# Patient Record
Sex: FEMALE | Race: WHITE | NOT HISPANIC OR LATINO | URBAN - METROPOLITAN AREA
[De-identification: names, ages, dates, MRNs, and addresses within clinical notes are randomized per-mention and may not be internally consistent; named-entity substitution may affect disease eponyms.]

---

## 2017-07-04 ENCOUNTER — EMERGENCY (EMERGENCY)
Facility: HOSPITAL | Age: 52
LOS: 1 days | Discharge: HOME | End: 2017-07-04
Attending: EMERGENCY MEDICINE | Admitting: EMERGENCY MEDICINE
Payer: MEDICARE

## 2017-07-04 VITALS
SYSTOLIC BLOOD PRESSURE: 126 MMHG | RESPIRATION RATE: 14 BRPM | OXYGEN SATURATION: 96 % | HEART RATE: 80 BPM | WEIGHT: 190.04 LBS | DIASTOLIC BLOOD PRESSURE: 68 MMHG | TEMPERATURE: 99 F | HEIGHT: 64 IN

## 2017-07-04 VITALS
HEART RATE: 77 BPM | RESPIRATION RATE: 14 BRPM | TEMPERATURE: 98 F | OXYGEN SATURATION: 97 % | DIASTOLIC BLOOD PRESSURE: 64 MMHG | SYSTOLIC BLOOD PRESSURE: 124 MMHG

## 2017-07-04 DIAGNOSIS — R10.9 UNSPECIFIED ABDOMINAL PAIN: ICD-10-CM

## 2017-07-04 DIAGNOSIS — J45.909 UNSPECIFIED ASTHMA, UNCOMPLICATED: ICD-10-CM

## 2017-07-04 DIAGNOSIS — I10 ESSENTIAL (PRIMARY) HYPERTENSION: ICD-10-CM

## 2017-07-04 DIAGNOSIS — Z98.890 OTHER SPECIFIED POSTPROCEDURAL STATES: ICD-10-CM

## 2017-07-04 DIAGNOSIS — Z98.89 OTHER SPECIFIED POSTPROCEDURAL STATES: Chronic | ICD-10-CM

## 2017-07-04 DIAGNOSIS — Z90.710 ACQUIRED ABSENCE OF BOTH CERVIX AND UTERUS: ICD-10-CM

## 2017-07-04 DIAGNOSIS — E11.9 TYPE 2 DIABETES MELLITUS WITHOUT COMPLICATIONS: ICD-10-CM

## 2017-07-04 LAB
ALBUMIN SERPL ELPH-MCNC: 4 G/DL — SIGNIFICANT CHANGE UP (ref 3.3–5)
ALP SERPL-CCNC: 47 U/L — SIGNIFICANT CHANGE UP (ref 40–120)
ALT FLD-CCNC: 28 U/L — SIGNIFICANT CHANGE UP (ref 12–78)
AMYLASE P1 CFR SERPL: 48 U/L — SIGNIFICANT CHANGE UP (ref 25–115)
ANION GAP SERPL CALC-SCNC: 6 MMOL/L — SIGNIFICANT CHANGE UP (ref 5–17)
ANISOCYTOSIS BLD QL: SLIGHT — SIGNIFICANT CHANGE UP
APPEARANCE UR: CLEAR — SIGNIFICANT CHANGE UP
APTT BLD: 32.4 SEC — SIGNIFICANT CHANGE UP (ref 27.5–37.4)
AST SERPL-CCNC: 24 U/L — SIGNIFICANT CHANGE UP (ref 15–37)
BACTERIA # UR AUTO: ABNORMAL
BASO STIPL BLD QL SMEAR: PRESENT — SIGNIFICANT CHANGE UP
BASOPHILS # BLD AUTO: 0.1 K/UL — SIGNIFICANT CHANGE UP (ref 0–0.2)
BASOPHILS NFR BLD AUTO: 0.8 % — SIGNIFICANT CHANGE UP (ref 0–2)
BILIRUB SERPL-MCNC: 0.3 MG/DL — SIGNIFICANT CHANGE UP (ref 0.2–1.2)
BILIRUB UR-MCNC: NEGATIVE — SIGNIFICANT CHANGE UP
BUN SERPL-MCNC: 5 MG/DL — LOW (ref 7–23)
CALCIUM SERPL-MCNC: 8.6 MG/DL — SIGNIFICANT CHANGE UP (ref 8.5–10.1)
CHLORIDE SERPL-SCNC: 98 MMOL/L — SIGNIFICANT CHANGE UP (ref 96–108)
CO2 SERPL-SCNC: 31 MMOL/L — SIGNIFICANT CHANGE UP (ref 22–31)
COLOR SPEC: YELLOW — SIGNIFICANT CHANGE UP
CREAT SERPL-MCNC: 0.7 MG/DL — SIGNIFICANT CHANGE UP (ref 0.5–1.3)
DIFF PNL FLD: ABNORMAL
ELLIPTOCYTES BLD QL SMEAR: SLIGHT — SIGNIFICANT CHANGE UP
EOSINOPHIL # BLD AUTO: 0.1 K/UL — SIGNIFICANT CHANGE UP (ref 0–0.5)
EOSINOPHIL NFR BLD AUTO: 1.3 % — SIGNIFICANT CHANGE UP (ref 0–6)
EPI CELLS # UR: SIGNIFICANT CHANGE UP
GLUCOSE SERPL-MCNC: 50 MG/DL — LOW (ref 70–99)
GLUCOSE UR QL: NEGATIVE — SIGNIFICANT CHANGE UP
HCG SERPL-ACNC: <1 MIU/ML — SIGNIFICANT CHANGE UP
HCT VFR BLD CALC: 32.7 % — LOW (ref 34.5–45)
HGB BLD-MCNC: 10.2 G/DL — LOW (ref 11.5–15.5)
HYPOCHROMIA BLD QL: SLIGHT — SIGNIFICANT CHANGE UP
INR BLD: 1.05 RATIO — SIGNIFICANT CHANGE UP (ref 0.88–1.16)
KETONES UR-MCNC: NEGATIVE — SIGNIFICANT CHANGE UP
LACTATE SERPL-SCNC: 1 MMOL/L — SIGNIFICANT CHANGE UP (ref 0.7–2)
LEUKOCYTE ESTERASE UR-ACNC: NEGATIVE — SIGNIFICANT CHANGE UP
LG PLATELETS BLD QL AUTO: SLIGHT — SIGNIFICANT CHANGE UP
LIDOCAIN IGE QN: 118 U/L — SIGNIFICANT CHANGE UP (ref 73–393)
LYMPHOCYTES # BLD AUTO: 2.8 K/UL — SIGNIFICANT CHANGE UP (ref 1–3.3)
LYMPHOCYTES # BLD AUTO: 26.2 % — SIGNIFICANT CHANGE UP (ref 13–44)
MACROCYTES BLD QL: SLIGHT — SIGNIFICANT CHANGE UP
MCHC RBC-ENTMCNC: 23 PG — LOW (ref 27–34)
MCHC RBC-ENTMCNC: 31.2 GM/DL — LOW (ref 32–36)
MCV RBC AUTO: 73.8 FL — LOW (ref 80–100)
MICROCYTES BLD QL: SLIGHT — SIGNIFICANT CHANGE UP
MONOCYTES # BLD AUTO: 1.1 K/UL — HIGH (ref 0–0.9)
MONOCYTES NFR BLD AUTO: 10.5 % — HIGH (ref 1–9)
NEUTROPHILS # BLD AUTO: 6.5 K/UL — SIGNIFICANT CHANGE UP (ref 1.8–7.4)
NEUTROPHILS NFR BLD AUTO: 61.2 % — SIGNIFICANT CHANGE UP (ref 43–77)
NITRITE UR-MCNC: NEGATIVE — SIGNIFICANT CHANGE UP
PH UR: 8 — SIGNIFICANT CHANGE UP (ref 5–8)
PLAT MORPH BLD: NORMAL — SIGNIFICANT CHANGE UP
PLATELET # BLD AUTO: 340 K/UL — SIGNIFICANT CHANGE UP (ref 150–400)
POIKILOCYTOSIS BLD QL AUTO: SLIGHT — SIGNIFICANT CHANGE UP
POLYCHROMASIA BLD QL SMEAR: SLIGHT — SIGNIFICANT CHANGE UP
POTASSIUM SERPL-MCNC: 3.7 MMOL/L — SIGNIFICANT CHANGE UP (ref 3.5–5.3)
POTASSIUM SERPL-SCNC: 3.7 MMOL/L — SIGNIFICANT CHANGE UP (ref 3.5–5.3)
PROT SERPL-MCNC: 7.9 G/DL — SIGNIFICANT CHANGE UP (ref 6–8.3)
PROT UR-MCNC: NEGATIVE — SIGNIFICANT CHANGE UP
PROTHROM AB SERPL-ACNC: 11.5 SEC — SIGNIFICANT CHANGE UP (ref 9.8–12.7)
RBC # BLD: 4.43 M/UL — SIGNIFICANT CHANGE UP (ref 3.8–5.2)
RBC # FLD: 16.9 % — HIGH (ref 10.3–14.5)
RBC BLD AUTO: SIGNIFICANT CHANGE UP
RBC CASTS # UR COMP ASSIST: SIGNIFICANT CHANGE UP /HPF (ref 0–4)
SODIUM SERPL-SCNC: 135 MMOL/L — SIGNIFICANT CHANGE UP (ref 135–145)
SP GR SPEC: 1.01 — SIGNIFICANT CHANGE UP (ref 1.01–1.02)
STOMATOCYTES BLD QL SMEAR: PRESENT — SIGNIFICANT CHANGE UP
UROBILINOGEN FLD QL: NEGATIVE — SIGNIFICANT CHANGE UP
WBC # BLD: 10.6 K/UL — HIGH (ref 3.8–10.5)
WBC # FLD AUTO: 10.6 K/UL — HIGH (ref 3.8–10.5)
WBC UR QL: SIGNIFICANT CHANGE UP

## 2017-07-04 PROCEDURE — 87086 URINE CULTURE/COLONY COUNT: CPT

## 2017-07-04 PROCEDURE — 71046 X-RAY EXAM CHEST 2 VIEWS: CPT

## 2017-07-04 PROCEDURE — 99284 EMERGENCY DEPT VISIT MOD MDM: CPT

## 2017-07-04 PROCEDURE — 85027 COMPLETE CBC AUTOMATED: CPT

## 2017-07-04 PROCEDURE — 74176 CT ABD & PELVIS W/O CONTRAST: CPT

## 2017-07-04 PROCEDURE — 84702 CHORIONIC GONADOTROPIN TEST: CPT

## 2017-07-04 PROCEDURE — 85730 THROMBOPLASTIN TIME PARTIAL: CPT

## 2017-07-04 PROCEDURE — 96361 HYDRATE IV INFUSION ADD-ON: CPT

## 2017-07-04 PROCEDURE — 85610 PROTHROMBIN TIME: CPT

## 2017-07-04 PROCEDURE — 83605 ASSAY OF LACTIC ACID: CPT

## 2017-07-04 PROCEDURE — 99284 EMERGENCY DEPT VISIT MOD MDM: CPT | Mod: 25

## 2017-07-04 PROCEDURE — 82150 ASSAY OF AMYLASE: CPT

## 2017-07-04 PROCEDURE — 83690 ASSAY OF LIPASE: CPT

## 2017-07-04 PROCEDURE — 71020: CPT | Mod: 26

## 2017-07-04 PROCEDURE — 80053 COMPREHEN METABOLIC PANEL: CPT

## 2017-07-04 PROCEDURE — 74176 CT ABD & PELVIS W/O CONTRAST: CPT | Mod: 26

## 2017-07-04 PROCEDURE — 81001 URINALYSIS AUTO W/SCOPE: CPT

## 2017-07-04 PROCEDURE — 96374 THER/PROPH/DIAG INJ IV PUSH: CPT

## 2017-07-04 RX ORDER — SODIUM CHLORIDE 9 MG/ML
1000 INJECTION INTRAMUSCULAR; INTRAVENOUS; SUBCUTANEOUS ONCE
Qty: 0 | Refills: 0 | Status: COMPLETED | OUTPATIENT
Start: 2017-07-04 | End: 2017-07-04

## 2017-07-04 RX ORDER — CEFUROXIME AXETIL 250 MG
500 TABLET ORAL ONCE
Qty: 0 | Refills: 0 | Status: COMPLETED | OUTPATIENT
Start: 2017-07-04 | End: 2017-07-04

## 2017-07-04 RX ORDER — KETOROLAC TROMETHAMINE 30 MG/ML
30 SYRINGE (ML) INJECTION ONCE
Qty: 0 | Refills: 0 | Status: DISCONTINUED | OUTPATIENT
Start: 2017-07-04 | End: 2017-07-04

## 2017-07-04 RX ORDER — CEFOXITIN 1 G/1
1 INJECTION, POWDER, FOR SOLUTION INTRAVENOUS
Qty: 14 | Refills: 0 | OUTPATIENT
Start: 2017-07-04 | End: 2017-07-11

## 2017-07-04 RX ORDER — SODIUM CHLORIDE 9 MG/ML
3 INJECTION INTRAMUSCULAR; INTRAVENOUS; SUBCUTANEOUS ONCE
Qty: 0 | Refills: 0 | Status: COMPLETED | OUTPATIENT
Start: 2017-07-04 | End: 2017-07-04

## 2017-07-04 RX ADMIN — Medication 30 MILLIGRAM(S): at 16:16

## 2017-07-04 RX ADMIN — SODIUM CHLORIDE 1000 MILLILITER(S): 9 INJECTION INTRAMUSCULAR; INTRAVENOUS; SUBCUTANEOUS at 16:16

## 2017-07-04 RX ADMIN — SODIUM CHLORIDE 3 MILLILITER(S): 9 INJECTION INTRAMUSCULAR; INTRAVENOUS; SUBCUTANEOUS at 15:05

## 2017-07-04 RX ADMIN — Medication 500 MILLIGRAM(S): at 16:28

## 2017-07-04 RX ADMIN — Medication 30 MILLIGRAM(S): at 16:42

## 2017-07-04 RX ADMIN — SODIUM CHLORIDE 2000 MILLILITER(S): 9 INJECTION INTRAMUSCULAR; INTRAVENOUS; SUBCUTANEOUS at 15:12

## 2017-07-04 NOTE — ED PROVIDER NOTE - ENMT, MLM
Airway patent, Nasal mucosa clear. Mouth with normal mucosa. Throat has no vesicles, no oropharyngeal exudates and uvula is midline. MM Moist. Non-toxic, well appearing. Neck supple. no meningeal signs.

## 2017-07-04 NOTE — ED PROVIDER NOTE - PROGRESS NOTE DETAILS
Reevaluated patient at bedside.  Patient feeling much improved.  Abdomen is now soft, non-tender. Discussed the results of all diagnostic testing in ED.  An opportunity to ask questions was given.  Discussed the nature of diagnostic testing in the abdomen and the importance of continued reevaluation.  Understanding of the potential risk of occult pathology was verbalized and the patient will continue to follow-up as an outpatient for further workup and evaluation until resolution.  Discussed the importance of prompt, close medical follow-up.  Patient will return with any changes, concerns or persistent / worsening symptoms.  Dw pt re risk of occult UTI, will rx with abx. Pt will call pmd in am for prompt fu (Renu)  All results were explained to patient and a copy of all available results given.

## 2017-07-04 NOTE — ED PROVIDER NOTE - OBJECTIVE STATEMENT
52 yo F p/w R back pain rad to flank / abd today. no fever/chills> No dysuria / hematuria. NO neck pain. No fever/chills. Pt states feels slight bloated. No dysuria / hematuria. NO neck / back pain. no agg/allev factors. No recent fall / trauma. No numb/ting/focal weak. No cp/sob/palp. No other inj or co. 52 yo F p/w R back pain rad to flank / abd today. no fever/chills> No dysuria / hematuria. NO neck pain. No fever/chills. Pt states feels slight bloated. No dysuria / hematuria. NO neck / back pain. no agg/allev factors. No recent fall / trauma. No numb/ting/focal weak. No cp/sob/palp. No other inj or co.  pt states had same sx last year and was assoc with UTI.

## 2017-07-04 NOTE — ED ADULT NURSE NOTE - PMH
Asthma in adult  mild intermittent asthma  Bipolar disorder    Deviated septum  snores  Diabetes mellitus  type 2  H/O heartburn    H/O: HTN (hypertension)    History of spinal stenosis    Obstructive sleep apnea    Sciatica of left side  with burning sensation left foot

## 2017-07-04 NOTE — ED ADULT NURSE NOTE - OBJECTIVE STATEMENT
Pt presents to the subacute area s/p right flank pain radiating to the groin area, denies difficulty urinating.

## 2017-07-05 LAB
CULTURE RESULTS: NO GROWTH — SIGNIFICANT CHANGE UP
SPECIMEN SOURCE: SIGNIFICANT CHANGE UP

## 2018-02-07 ENCOUNTER — EMERGENCY (EMERGENCY)
Facility: HOSPITAL | Age: 53
LOS: 1 days | Discharge: HOME | End: 2018-02-07
Attending: EMERGENCY MEDICINE | Admitting: EMERGENCY MEDICINE
Payer: MEDICARE

## 2018-02-07 VITALS
WEIGHT: 192.9 LBS | SYSTOLIC BLOOD PRESSURE: 130 MMHG | DIASTOLIC BLOOD PRESSURE: 82 MMHG | HEART RATE: 90 BPM | RESPIRATION RATE: 18 BRPM | TEMPERATURE: 99 F | OXYGEN SATURATION: 98 % | HEIGHT: 64 IN

## 2018-02-07 DIAGNOSIS — Z98.89 OTHER SPECIFIED POSTPROCEDURAL STATES: Chronic | ICD-10-CM

## 2018-02-07 LAB
ALBUMIN SERPL ELPH-MCNC: 3.8 G/DL — SIGNIFICANT CHANGE UP (ref 3.3–5)
ALP SERPL-CCNC: 50 U/L — SIGNIFICANT CHANGE UP (ref 40–120)
ALT FLD-CCNC: 30 U/L — SIGNIFICANT CHANGE UP (ref 12–78)
ANION GAP SERPL CALC-SCNC: 10 MMOL/L — SIGNIFICANT CHANGE UP (ref 5–17)
AST SERPL-CCNC: 34 U/L — SIGNIFICANT CHANGE UP (ref 15–37)
BASOPHILS # BLD AUTO: 0.1 K/UL — SIGNIFICANT CHANGE UP (ref 0–0.2)
BASOPHILS NFR BLD AUTO: 1.3 % — SIGNIFICANT CHANGE UP (ref 0–2)
BILIRUB SERPL-MCNC: 0.4 MG/DL — SIGNIFICANT CHANGE UP (ref 0.2–1.2)
BUN SERPL-MCNC: 9 MG/DL — SIGNIFICANT CHANGE UP (ref 7–23)
CALCIUM SERPL-MCNC: 8.7 MG/DL — SIGNIFICANT CHANGE UP (ref 8.5–10.1)
CHLORIDE SERPL-SCNC: 103 MMOL/L — SIGNIFICANT CHANGE UP (ref 96–108)
CO2 SERPL-SCNC: 25 MMOL/L — SIGNIFICANT CHANGE UP (ref 22–31)
CREAT SERPL-MCNC: 0.64 MG/DL — SIGNIFICANT CHANGE UP (ref 0.5–1.3)
EOSINOPHIL # BLD AUTO: 0 K/UL — SIGNIFICANT CHANGE UP (ref 0–0.5)
EOSINOPHIL NFR BLD AUTO: 0.5 % — SIGNIFICANT CHANGE UP (ref 0–6)
GLUCOSE SERPL-MCNC: 146 MG/DL — HIGH (ref 70–99)
HCT VFR BLD CALC: 32.7 % — LOW (ref 34.5–45)
HGB BLD-MCNC: 9.6 G/DL — LOW (ref 11.5–15.5)
LACTATE SERPL-SCNC: 1.3 MMOL/L — SIGNIFICANT CHANGE UP (ref 0.7–2)
LYMPHOCYTES # BLD AUTO: 0.9 K/UL — LOW (ref 1–3.3)
LYMPHOCYTES # BLD AUTO: 15.2 % — SIGNIFICANT CHANGE UP (ref 13–44)
MCHC RBC-ENTMCNC: 21.2 PG — LOW (ref 27–34)
MCHC RBC-ENTMCNC: 29.5 GM/DL — LOW (ref 32–36)
MCV RBC AUTO: 71.9 FL — LOW (ref 80–100)
MONOCYTES # BLD AUTO: 0.4 K/UL — SIGNIFICANT CHANGE UP (ref 0–0.9)
MONOCYTES NFR BLD AUTO: 7.2 % — SIGNIFICANT CHANGE UP (ref 1–9)
NEUTROPHILS # BLD AUTO: 4.5 K/UL — SIGNIFICANT CHANGE UP (ref 1.8–7.4)
NEUTROPHILS NFR BLD AUTO: 75.8 % — SIGNIFICANT CHANGE UP (ref 43–77)
PLAT MORPH BLD: NORMAL — SIGNIFICANT CHANGE UP
PLATELET # BLD AUTO: 276 K/UL — SIGNIFICANT CHANGE UP (ref 150–400)
POTASSIUM SERPL-MCNC: 3.5 MMOL/L — SIGNIFICANT CHANGE UP (ref 3.5–5.3)
POTASSIUM SERPL-SCNC: 3.5 MMOL/L — SIGNIFICANT CHANGE UP (ref 3.5–5.3)
PROT SERPL-MCNC: 7.9 G/DL — SIGNIFICANT CHANGE UP (ref 6–8.3)
RBC # BLD: 4.55 M/UL — SIGNIFICANT CHANGE UP (ref 3.8–5.2)
RBC # FLD: 18 % — HIGH (ref 10.3–14.5)
RBC BLD AUTO: SIGNIFICANT CHANGE UP
SODIUM SERPL-SCNC: 138 MMOL/L — SIGNIFICANT CHANGE UP (ref 135–145)
WBC # BLD: 6 K/UL — SIGNIFICANT CHANGE UP (ref 3.8–10.5)
WBC # FLD AUTO: 6 K/UL — SIGNIFICANT CHANGE UP (ref 3.8–10.5)

## 2018-02-07 PROCEDURE — 99285 EMERGENCY DEPT VISIT HI MDM: CPT

## 2018-02-07 PROCEDURE — 71046 X-RAY EXAM CHEST 2 VIEWS: CPT | Mod: 26

## 2018-02-07 RX ORDER — ALBUTEROL 90 UG/1
2.5 AEROSOL, METERED ORAL ONCE
Qty: 0 | Refills: 0 | Status: COMPLETED | OUTPATIENT
Start: 2018-02-07 | End: 2018-02-07

## 2018-02-07 RX ORDER — FAMOTIDINE 10 MG/ML
20 INJECTION INTRAVENOUS ONCE
Qty: 0 | Refills: 0 | Status: COMPLETED | OUTPATIENT
Start: 2018-02-07 | End: 2018-02-07

## 2018-02-07 RX ORDER — ONDANSETRON 8 MG/1
4 TABLET, FILM COATED ORAL ONCE
Qty: 0 | Refills: 0 | Status: COMPLETED | OUTPATIENT
Start: 2018-02-07 | End: 2018-02-07

## 2018-02-07 RX ORDER — SODIUM CHLORIDE 9 MG/ML
3 INJECTION INTRAMUSCULAR; INTRAVENOUS; SUBCUTANEOUS ONCE
Qty: 0 | Refills: 0 | Status: COMPLETED | OUTPATIENT
Start: 2018-02-07 | End: 2018-02-07

## 2018-02-07 RX ORDER — IPRATROPIUM/ALBUTEROL SULFATE 18-103MCG
3 AEROSOL WITH ADAPTER (GRAM) INHALATION ONCE
Qty: 0 | Refills: 0 | Status: COMPLETED | OUTPATIENT
Start: 2018-02-07 | End: 2018-02-07

## 2018-02-07 RX ORDER — BUDESONIDE, MICRONIZED 100 %
0.5 POWDER (GRAM) MISCELLANEOUS ONCE
Qty: 0 | Refills: 0 | Status: COMPLETED | OUTPATIENT
Start: 2018-02-07 | End: 2018-02-07

## 2018-02-07 RX ORDER — SODIUM CHLORIDE 9 MG/ML
1000 INJECTION INTRAMUSCULAR; INTRAVENOUS; SUBCUTANEOUS
Qty: 0 | Refills: 0 | Status: COMPLETED | OUTPATIENT
Start: 2018-02-07 | End: 2018-02-08

## 2018-02-07 RX ADMIN — SODIUM CHLORIDE 1000 MILLILITER(S): 9 INJECTION INTRAMUSCULAR; INTRAVENOUS; SUBCUTANEOUS at 23:10

## 2018-02-07 RX ADMIN — SODIUM CHLORIDE 3 MILLILITER(S): 9 INJECTION INTRAMUSCULAR; INTRAVENOUS; SUBCUTANEOUS at 23:00

## 2018-02-07 RX ADMIN — FAMOTIDINE 20 MILLIGRAM(S): 10 INJECTION INTRAVENOUS at 23:12

## 2018-02-07 RX ADMIN — Medication 1 MILLIGRAM(S): at 23:12

## 2018-02-07 RX ADMIN — ALBUTEROL 2.5 MILLIGRAM(S): 90 AEROSOL, METERED ORAL at 23:18

## 2018-02-07 RX ADMIN — ONDANSETRON 4 MILLIGRAM(S): 8 TABLET, FILM COATED ORAL at 23:12

## 2018-02-07 NOTE — ED PROVIDER NOTE - OBJECTIVE STATEMENT
fever and, sweating, x 2-3 days, diarrhea x 3 today.  ho chronic back pain and on Lyrica.  pmd- Dru Sears fever and, sweating, x 2-3 days, diarrhea x 3 today.  ho chronic back pain and on Lyrica.  pmd- Ana Sears

## 2018-02-07 NOTE — ED ADULT NURSE NOTE - OBJECTIVE STATEMENT
Pt to ED via ambulance c/o cough, weakness, shortness of breath.  Pt A&Ox4, but easily agitated and angers quickly, sometimes slow to answer questions or yells "I don't know."  Pt has some inappropriate behavior.  Pt and friend at bedside state she has been sick "for months" and friend confirms that pt has been "back and forth to the doctors" and "on several different medications" but "she isn't getting better."  Pt has been coughing, c/o fatigue and weakness.

## 2018-02-08 VITALS
DIASTOLIC BLOOD PRESSURE: 69 MMHG | OXYGEN SATURATION: 93 % | RESPIRATION RATE: 17 BRPM | TEMPERATURE: 99 F | SYSTOLIC BLOOD PRESSURE: 143 MMHG | HEART RATE: 99 BPM

## 2018-02-08 LAB
FLUBV RNA SPEC QL NAA+PROBE: DETECTED
RAPID RVP RESULT: DETECTED

## 2018-02-08 PROCEDURE — 96375 TX/PRO/DX INJ NEW DRUG ADDON: CPT

## 2018-02-08 PROCEDURE — 99284 EMERGENCY DEPT VISIT MOD MDM: CPT | Mod: 25

## 2018-02-08 PROCEDURE — 36415 COLL VENOUS BLD VENIPUNCTURE: CPT

## 2018-02-08 PROCEDURE — 87633 RESP VIRUS 12-25 TARGETS: CPT

## 2018-02-08 PROCEDURE — 83605 ASSAY OF LACTIC ACID: CPT

## 2018-02-08 PROCEDURE — 87798 DETECT AGENT NOS DNA AMP: CPT

## 2018-02-08 PROCEDURE — 87040 BLOOD CULTURE FOR BACTERIA: CPT

## 2018-02-08 PROCEDURE — 71046 X-RAY EXAM CHEST 2 VIEWS: CPT

## 2018-02-08 PROCEDURE — 85027 COMPLETE CBC AUTOMATED: CPT

## 2018-02-08 PROCEDURE — 94640 AIRWAY INHALATION TREATMENT: CPT

## 2018-02-08 PROCEDURE — 80053 COMPREHEN METABOLIC PANEL: CPT

## 2018-02-08 PROCEDURE — 87486 CHLMYD PNEUM DNA AMP PROBE: CPT

## 2018-02-08 PROCEDURE — 87581 M.PNEUMON DNA AMP PROBE: CPT

## 2018-02-08 PROCEDURE — 96374 THER/PROPH/DIAG INJ IV PUSH: CPT

## 2018-02-08 PROCEDURE — 82962 GLUCOSE BLOOD TEST: CPT

## 2018-02-08 RX ADMIN — Medication 0.5 MILLIGRAM(S): at 00:03

## 2018-02-08 RX ADMIN — ALBUTEROL 2.5 MILLIGRAM(S): 90 AEROSOL, METERED ORAL at 01:11

## 2018-02-08 RX ADMIN — Medication 3 MILLILITER(S): at 01:11

## 2018-02-08 RX ADMIN — SODIUM CHLORIDE 1000 MILLILITER(S): 9 INJECTION INTRAMUSCULAR; INTRAVENOUS; SUBCUTANEOUS at 00:08

## 2018-02-13 LAB
CULTURE RESULTS: SIGNIFICANT CHANGE UP
CULTURE RESULTS: SIGNIFICANT CHANGE UP
SPECIMEN SOURCE: SIGNIFICANT CHANGE UP
SPECIMEN SOURCE: SIGNIFICANT CHANGE UP

## 2018-03-15 ENCOUNTER — INPATIENT (INPATIENT)
Facility: HOSPITAL | Age: 53
LOS: 0 days | Discharge: HOME | DRG: 812 | End: 2018-03-16
Attending: HOSPITALIST | Admitting: HOSPITALIST
Payer: MEDICARE

## 2018-03-15 VITALS
TEMPERATURE: 100 F | RESPIRATION RATE: 18 BRPM | WEIGHT: 181 LBS | DIASTOLIC BLOOD PRESSURE: 72 MMHG | OXYGEN SATURATION: 96 % | HEART RATE: 90 BPM | SYSTOLIC BLOOD PRESSURE: 112 MMHG | HEIGHT: 64 IN

## 2018-03-15 DIAGNOSIS — Z29.9 ENCOUNTER FOR PROPHYLACTIC MEASURES, UNSPECIFIED: ICD-10-CM

## 2018-03-15 DIAGNOSIS — Z98.89 OTHER SPECIFIED POSTPROCEDURAL STATES: Chronic | ICD-10-CM

## 2018-03-15 DIAGNOSIS — E11.9 TYPE 2 DIABETES MELLITUS WITHOUT COMPLICATIONS: ICD-10-CM

## 2018-03-15 DIAGNOSIS — J45.909 UNSPECIFIED ASTHMA, UNCOMPLICATED: ICD-10-CM

## 2018-03-15 DIAGNOSIS — Z86.79 PERSONAL HISTORY OF OTHER DISEASES OF THE CIRCULATORY SYSTEM: ICD-10-CM

## 2018-03-15 DIAGNOSIS — E66.9 OBESITY, UNSPECIFIED: ICD-10-CM

## 2018-03-15 DIAGNOSIS — D64.9 ANEMIA, UNSPECIFIED: ICD-10-CM

## 2018-03-15 DIAGNOSIS — G47.33 OBSTRUCTIVE SLEEP APNEA (ADULT) (PEDIATRIC): ICD-10-CM

## 2018-03-15 DIAGNOSIS — F31.9 BIPOLAR DISORDER, UNSPECIFIED: ICD-10-CM

## 2018-03-15 LAB
ALBUMIN SERPL ELPH-MCNC: 3.7 G/DL — SIGNIFICANT CHANGE UP (ref 3.3–5)
ALP SERPL-CCNC: 48 U/L — SIGNIFICANT CHANGE UP (ref 40–120)
ALT FLD-CCNC: 24 U/L — SIGNIFICANT CHANGE UP (ref 12–78)
ANION GAP SERPL CALC-SCNC: 7 MMOL/L — SIGNIFICANT CHANGE UP (ref 5–17)
APTT BLD: 30.4 SEC — SIGNIFICANT CHANGE UP (ref 27.5–37.4)
AST SERPL-CCNC: 27 U/L — SIGNIFICANT CHANGE UP (ref 15–37)
BASOPHILS # BLD AUTO: 0.2 K/UL — SIGNIFICANT CHANGE UP (ref 0–0.2)
BASOPHILS NFR BLD AUTO: 1.3 % — SIGNIFICANT CHANGE UP (ref 0–2)
BILIRUB SERPL-MCNC: 0.3 MG/DL — SIGNIFICANT CHANGE UP (ref 0.2–1.2)
BLD GP AB SCN SERPL QL: SIGNIFICANT CHANGE UP
BUN SERPL-MCNC: 14 MG/DL — SIGNIFICANT CHANGE UP (ref 7–23)
CALCIUM SERPL-MCNC: 9 MG/DL — SIGNIFICANT CHANGE UP (ref 8.5–10.1)
CHLORIDE SERPL-SCNC: 100 MMOL/L — SIGNIFICANT CHANGE UP (ref 96–108)
CO2 SERPL-SCNC: 28 MMOL/L — SIGNIFICANT CHANGE UP (ref 22–31)
CREAT SERPL-MCNC: 0.8 MG/DL — SIGNIFICANT CHANGE UP (ref 0.5–1.3)
EOSINOPHIL # BLD AUTO: 0.1 K/UL — SIGNIFICANT CHANGE UP (ref 0–0.5)
EOSINOPHIL NFR BLD AUTO: 0.9 % — SIGNIFICANT CHANGE UP (ref 0–6)
GLUCOSE SERPL-MCNC: 80 MG/DL — SIGNIFICANT CHANGE UP (ref 70–99)
HCT VFR BLD CALC: 30.1 % — LOW (ref 34.5–45)
HGB BLD-MCNC: 9.2 G/DL — LOW (ref 11.5–15.5)
INR BLD: 1.07 RATIO — SIGNIFICANT CHANGE UP (ref 0.88–1.16)
LYMPHOCYTES # BLD AUTO: 31.8 % — SIGNIFICANT CHANGE UP (ref 13–44)
LYMPHOCYTES # BLD AUTO: 4 K/UL — HIGH (ref 1–3.3)
MCHC RBC-ENTMCNC: 22 PG — LOW (ref 27–34)
MCHC RBC-ENTMCNC: 30.7 GM/DL — LOW (ref 32–36)
MCV RBC AUTO: 71.6 FL — LOW (ref 80–100)
MONOCYTES # BLD AUTO: 0.8 K/UL — SIGNIFICANT CHANGE UP (ref 0–0.9)
MONOCYTES NFR BLD AUTO: 6.3 % — SIGNIFICANT CHANGE UP (ref 1–9)
NEUTROPHILS # BLD AUTO: 7.5 K/UL — HIGH (ref 1.8–7.4)
NEUTROPHILS NFR BLD AUTO: 59.7 % — SIGNIFICANT CHANGE UP (ref 43–77)
OB PNL STL: NEGATIVE — SIGNIFICANT CHANGE UP
PCP SPEC-MCNC: SIGNIFICANT CHANGE UP
PLATELET # BLD AUTO: 479 K/UL — HIGH (ref 150–400)
POTASSIUM SERPL-MCNC: 4.3 MMOL/L — SIGNIFICANT CHANGE UP (ref 3.5–5.3)
POTASSIUM SERPL-SCNC: 4.3 MMOL/L — SIGNIFICANT CHANGE UP (ref 3.5–5.3)
PROT SERPL-MCNC: 7.7 G/DL — SIGNIFICANT CHANGE UP (ref 6–8.3)
PROTHROM AB SERPL-ACNC: 11.7 SEC — SIGNIFICANT CHANGE UP (ref 9.8–12.7)
RBC # BLD: 4.2 M/UL — SIGNIFICANT CHANGE UP (ref 3.8–5.2)
RBC # FLD: 17.1 % — HIGH (ref 10.3–14.5)
SODIUM SERPL-SCNC: 135 MMOL/L — SIGNIFICANT CHANGE UP (ref 135–145)
WBC # BLD: 12.6 K/UL — HIGH (ref 3.8–10.5)
WBC # FLD AUTO: 12.6 K/UL — HIGH (ref 3.8–10.5)

## 2018-03-15 PROCEDURE — 99223 1ST HOSP IP/OBS HIGH 75: CPT | Mod: AI,GC

## 2018-03-15 PROCEDURE — 99285 EMERGENCY DEPT VISIT HI MDM: CPT

## 2018-03-15 PROCEDURE — 93970 EXTREMITY STUDY: CPT | Mod: 26

## 2018-03-15 PROCEDURE — 71045 X-RAY EXAM CHEST 1 VIEW: CPT | Mod: 26

## 2018-03-15 PROCEDURE — 93010 ELECTROCARDIOGRAM REPORT: CPT

## 2018-03-15 RX ORDER — INSULIN LISPRO 100/ML
VIAL (ML) SUBCUTANEOUS
Qty: 0 | Refills: 0 | Status: DISCONTINUED | OUTPATIENT
Start: 2018-03-15 | End: 2018-03-16

## 2018-03-15 RX ORDER — PANTOPRAZOLE SODIUM 20 MG/1
40 TABLET, DELAYED RELEASE ORAL
Qty: 0 | Refills: 0 | Status: DISCONTINUED | OUTPATIENT
Start: 2018-03-15 | End: 2018-03-16

## 2018-03-15 RX ORDER — DEXTROSE 50 % IN WATER 50 %
12.5 SYRINGE (ML) INTRAVENOUS ONCE
Qty: 0 | Refills: 0 | Status: DISCONTINUED | OUTPATIENT
Start: 2018-03-15 | End: 2018-03-16

## 2018-03-15 RX ORDER — LEVOTHYROXINE SODIUM 125 MCG
1 TABLET ORAL
Qty: 0 | Refills: 0 | COMMUNITY

## 2018-03-15 RX ORDER — CITALOPRAM 10 MG/1
1 TABLET, FILM COATED ORAL
Qty: 0 | Refills: 0 | COMMUNITY

## 2018-03-15 RX ORDER — BUDESONIDE AND FORMOTEROL FUMARATE DIHYDRATE 160; 4.5 UG/1; UG/1
2 AEROSOL RESPIRATORY (INHALATION)
Qty: 0 | Refills: 0 | Status: DISCONTINUED | OUTPATIENT
Start: 2018-03-15 | End: 2018-03-16

## 2018-03-15 RX ORDER — DEXTROSE 50 % IN WATER 50 %
25 SYRINGE (ML) INTRAVENOUS ONCE
Qty: 0 | Refills: 0 | Status: DISCONTINUED | OUTPATIENT
Start: 2018-03-15 | End: 2018-03-16

## 2018-03-15 RX ORDER — OLANZAPINE 15 MG/1
5 TABLET, FILM COATED ORAL AT BEDTIME
Qty: 0 | Refills: 0 | Status: DISCONTINUED | OUTPATIENT
Start: 2018-03-16 | End: 2018-03-16

## 2018-03-15 RX ORDER — GLUCAGON INJECTION, SOLUTION 0.5 MG/.1ML
1 INJECTION, SOLUTION SUBCUTANEOUS ONCE
Qty: 0 | Refills: 0 | Status: DISCONTINUED | OUTPATIENT
Start: 2018-03-15 | End: 2018-03-16

## 2018-03-15 RX ORDER — LEVOTHYROXINE SODIUM 125 MCG
125 TABLET ORAL
Qty: 0 | Refills: 0 | Status: DISCONTINUED | OUTPATIENT
Start: 2018-03-17 | End: 2018-03-16

## 2018-03-15 RX ORDER — CITALOPRAM 10 MG/1
60 TABLET, FILM COATED ORAL AT BEDTIME
Qty: 0 | Refills: 0 | Status: DISCONTINUED | OUTPATIENT
Start: 2018-03-16 | End: 2018-03-16

## 2018-03-15 RX ORDER — HYDROCHLOROTHIAZIDE 25 MG
25 TABLET ORAL DAILY
Qty: 0 | Refills: 0 | Status: DISCONTINUED | OUTPATIENT
Start: 2018-03-15 | End: 2018-03-16

## 2018-03-15 RX ORDER — IPRATROPIUM/ALBUTEROL SULFATE 18-103MCG
3 AEROSOL WITH ADAPTER (GRAM) INHALATION ONCE
Qty: 0 | Refills: 0 | Status: COMPLETED | OUTPATIENT
Start: 2018-03-15 | End: 2018-03-15

## 2018-03-15 RX ORDER — SODIUM CHLORIDE 9 MG/ML
1000 INJECTION INTRAMUSCULAR; INTRAVENOUS; SUBCUTANEOUS ONCE
Qty: 0 | Refills: 0 | Status: COMPLETED | OUTPATIENT
Start: 2018-03-15 | End: 2018-03-15

## 2018-03-15 RX ORDER — ALBUTEROL 90 UG/1
2 AEROSOL, METERED ORAL EVERY 6 HOURS
Qty: 0 | Refills: 0 | Status: DISCONTINUED | OUTPATIENT
Start: 2018-03-15 | End: 2018-03-16

## 2018-03-15 RX ORDER — SODIUM CHLORIDE 9 MG/ML
1000 INJECTION, SOLUTION INTRAVENOUS
Qty: 0 | Refills: 0 | Status: DISCONTINUED | OUTPATIENT
Start: 2018-03-15 | End: 2018-03-16

## 2018-03-15 RX ORDER — LEVOTHYROXINE SODIUM 125 MCG
112 TABLET ORAL
Qty: 0 | Refills: 0 | Status: DISCONTINUED | OUTPATIENT
Start: 2018-03-16 | End: 2018-03-16

## 2018-03-15 RX ORDER — LISINOPRIL 2.5 MG/1
20 TABLET ORAL DAILY
Qty: 0 | Refills: 0 | Status: DISCONTINUED | OUTPATIENT
Start: 2018-03-15 | End: 2018-03-16

## 2018-03-15 RX ORDER — DEXTROSE 50 % IN WATER 50 %
1 SYRINGE (ML) INTRAVENOUS ONCE
Qty: 0 | Refills: 0 | Status: DISCONTINUED | OUTPATIENT
Start: 2018-03-15 | End: 2018-03-16

## 2018-03-15 RX ORDER — ATORVASTATIN CALCIUM 80 MG/1
10 TABLET, FILM COATED ORAL AT BEDTIME
Qty: 0 | Refills: 0 | Status: DISCONTINUED | OUTPATIENT
Start: 2018-03-15 | End: 2018-03-16

## 2018-03-15 RX ADMIN — Medication 3 MILLILITER(S): at 17:43

## 2018-03-15 RX ADMIN — Medication 125 MILLIGRAM(S): at 17:43

## 2018-03-15 RX ADMIN — SODIUM CHLORIDE 1000 MILLILITER(S): 9 INJECTION INTRAMUSCULAR; INTRAVENOUS; SUBCUTANEOUS at 17:43

## 2018-03-15 NOTE — CONSULT NOTE ADULT - PROBLEM SELECTOR RECOMMENDATION 3
obese  metabolic syndrome  anemia and HTN and DM  medical supportive care and regimen  HEME eval in am  serial Monitoring of Hgb and VS and HD and Sat  will follow  discussed with pt and ER MD

## 2018-03-15 NOTE — H&P ADULT - NSHPOUTPATIENTPROVIDERS_GEN_ALL_CORE
PMD: Dr. Ramirez PMD: Dr. Ramirez  Pulm: Dr. Lang  Neuro: Dr. John (pt is unsure of name and spelling)  Pain doctor: Dr. Rosenberg (pt is unsure of name and spelling)

## 2018-03-15 NOTE — H&P ADULT - PROBLEM SELECTOR PLAN 1
Transfuse  Blood consent obtained  Type and screen performed Will evaluate for GI bleed. May be more chronic in nature.  FOBT negative. No transfusion needed at this time due to patient's Hg being stable  Transfuse as needed  Blood consent obtained  Type and screen performed  Heme Dr. Prasad's group consulted. F/u recs  F/u repeat h/h microcytic  Remote Hx microcytic anemia as child?  FOBT neg, Will reevaluate for GI bleed. May be more chronic in nature.  No transfusion needed at this time due to patient's Hg being stable  Transfuse as needed  Blood consent obtained  Type and screen performed  Heme Dr. Prasad's group consulted in ED . F/u recs  F/u repeat h/h

## 2018-03-15 NOTE — CONSULT NOTE ADULT - SUBJECTIVE AND OBJECTIVE BOX
Date/Time Patient Seen:  		  Referring MD:   Data Reviewed	       Patient is a 52y old  Female who presents with a chief complaint of sob, weakness, keating, dizziness      Subjective/HPI    in bed  seen and examined  vs and meds reviewed  smoker, smokes Marijuana  lives in the house    heavy ex smoker  alert  originally from [place]    50-year-old woman with a history of diabetes, bipolar disorder, obstructive sleep apnea, hypertension,     PAST MEDICAL & SURGICAL HISTORY:  Obstructive sleep apnea  Deviated septum: snores  Sciatica of left side: with burning sensation left foot  Asthma in adult: mild intermittent asthma  H/O heartburn  Diabetes mellitus: type 2  Bipolar disorder  H/O: HTN (hypertension)  History of spinal stenosis  H/O colonoscopy  H/O unilateral oophorectomy: left ovary 2004  S/P partial hysterectomy: age 35        Medication list         MEDICATIONS  (STANDING):    MEDICATIONS  (PRN):  ALBUTerol    90 MICROgram(s) HFA Inhaler 2 Puff(s) Inhalation every 6 hours PRN Shortness of Breath and/or Wheezing         Vitals log        ICU Vital Signs Last 24 Hrs  T(C): 37.6 (15 Mar 2018 16:28), Max: 37.6 (15 Mar 2018 16:28)  T(F): 99.7 (15 Mar 2018 16:28), Max: 99.7 (15 Mar 2018 16:28)  HR: 85 (15 Mar 2018 20:00) (85 - 90)  BP: 110/70 (15 Mar 2018 20:00) (110/70 - 112/72)  BP(mean): --  ABP: --  ABP(mean): --  RR: 16 (15 Mar 2018 20:00) (16 - 18)  SpO2: 99% (15 Mar 2018 20:00) (96% - 99%)           Input and Output:  I&O's Detail      Lab Data                        9.2    12.6  )-----------( 479      ( 15 Mar 2018 17:05 )             30.1     03-15    135  |  100  |  14  ----------------------------<  80  4.3   |  28  |  0.80    Ca    9.0      15 Mar 2018 17:05    TPro  7.7  /  Alb  3.7  /  TBili  0.3  /  DBili  x   /  AST  27  /  ALT  24  /  AlkPhos  48  03-15      ex smoker  THC use        Review of Systems	  keating  weak  dizziness      Objective     Physical Examination    obese  head at  heart s1s2  lung dec BS  abd soft  cn grossly int  moves all extr      Pertinent Lab findings & Imaging      Gant:  NO   Adequate UO     I&O's Detail           Discussed with:     Cultures:	        Radiology    cxr clear

## 2018-03-15 NOTE — H&P ADULT - PROBLEM SELECTOR PLAN 10
IMPROVE VTE Individual Risk Assessment          RISK                                                          Points    [  ] Previous VTE                                                3  [  ] Thrombophilia                                             2  [  ] Lower limb paralysis                                   2        (unable to hold up >15 seconds)    [  ] Current Cancer                                            2         (within 6 months)  [  ] Immobilization > 24 hrs                              1  [  ] ICU/CCU stay > 24 hours                            1  [  ] Age > 60                                                    1    IMPROVE VTE Score _____0____    DVT ppx: SCDs b/l  GI ppx: Continue protonix 40 mg oral IMPROVE VTE Individual Risk Assessment          RISK                                                          Points    [  ] Previous VTE                                                3  [  ] Thrombophilia                                             2  [  ] Lower limb paralysis                                   2        (unable to hold up >15 seconds)    [  ] Current Cancer                                            2         (within 6 months)  [  ] Immobilization > 24 hrs                              1  [  ] ICU/CCU stay > 24 hours                            1  [  ] Age > 60                                                    1    IMPROVE VTE Score _____0____    DVT ppx: SCDs b/l  GI ppx: Continue protonix 40 mg oral    Problem 11: Neuropathy- continue home dose lyrica

## 2018-03-15 NOTE — H&P ADULT - PROBLEM SELECTOR PLAN 3
CIPAP machine Type 2 Diabetes   Hold home medications: metformin 500 mg BID and tradjenta 5 mg QD  Hypoglycemia Protocol, ALEX, Hafsa AC&HS.  Diet: Consistent Carbs w/ Evening Snack.  Follow up HbA1c

## 2018-03-15 NOTE — ED ADULT NURSE NOTE - CHPI ED SYMPTOMS NEG
no tingling/no vomiting/no pain/no decreased eating/drinking/no fever/no chills/no nausea/no numbness/no dizziness

## 2018-03-15 NOTE — H&P ADULT - PROBLEM SELECTOR PLAN 5
Chronic, stable  Continue home dose lyrica  Continue home dose olanzapine  Continue home dose citalopram Chronic, stable  Continue home dose olanzapine  Continue home dose citalopram

## 2018-03-15 NOTE — H&P ADULT - FAMILY HISTORY
Family history of renal failure     Family history of prostate cancer     Sibling  Still living? Yes, Estimated age: Age Unknown  Family history of hypercholesterolemia, Age at diagnosis: Age Unknown

## 2018-03-15 NOTE — CONSULT NOTE ADULT - PROBLEM SELECTOR RECOMMENDATION 2
proventil  PRN  Symbicort  will check CRP  smokes Marijuana, heavy ex smoker  keep sat > 88 pct  will need PFT with [facility] as outpatient  smoking cess ed and counseling  will check CT chest for eval of emphysema and COPD   will check U tox

## 2018-03-15 NOTE — ED PROVIDER NOTE - OBJECTIVE STATEMENT
pt referred by pmd for anemia on outpt labs with hgb 8.9 and low iron. + dizziness. pt relates hx of gi bleeding 2 yrs ago. pt denies fevers, chills, ha, n/v, bloody or black stools.  pmd - diego goddard  heme - none

## 2018-03-15 NOTE — H&P ADULT - NSHPSOCIALHISTORY_GEN_ALL_CORE
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 quit 12 years ago. 35 year smoking hx, 2 PPD  ETOH use: denies    Influenza vaccine: did not have vaccine. Had influenza.  Colonoscopy: 2 years prior. Reports normal results.  Mammogram: states that she is due for mammogram

## 2018-03-15 NOTE — H&P ADULT - PROBLEM SELECTOR PLAN 2
Dr. Justine bran consulted.   Proventil PRN, symbicort, albuterol inhalers   Keep sat >88 pct  F/u CT chest  F/u LE doppler   smoking cess ed and counseling  will check CT chest for eval of emphysema and COPD   will check U tox. Dr. Justine bran consulted.   Proventil PRN, symbicort, albuterol inhalers   Keep sat >88 pct  F/u LE doppler   Will check CT chest for eval of emphysema and COPD   F/u U tox. Dr. Justine bran consulted in ED   Proventil PRN, symbicort, albuterol inhalers   Keep sat >88 pct  F/u LE doppler   Will check CT chest for eval of emphysema and COPD   F/u U tox. Dr. Justine bran consulted in ED   Proventil PRN, symbicort, albuterol inhalers   Keep sat >88 pct  F/u LE doppler   Will check CT chest for eval of emphysema and COPD   Leukocytosis possibly reactive. Not likely infectious.  F/u U tox.

## 2018-03-15 NOTE — H&P ADULT - HISTORY OF PRESENT ILLNESS
51 yo F history of diabetes, hx of GI bleed bipolar disorder, obstructive sleep apnea, hypertension,  In the ED, WBC 12.6, Hg 9.2, Hct 30.1, platelet 479, PT 11.7, INR 1.07, PTT 30.4, Na 135, K 4.3, Cl 100, CO2 28, BUN 14, Cr 0.80, FOBT negative.  Imaging:  CXR: negative   EKG: 53 yo F history of mild intermittent asthma, diabetes mellitus type 2, hx of GI bleed, bipolar disorder, obstructive sleep apnea, deviated septum, hypertension, GERD, sciatica of left foot, presented to the ED with complaints of   In the ED, WBC 12.6, Hg 9.2, Hct 30.1, platelet 479, PT 11.7, INR 1.07, PTT 30.4, Na 135, K 4.3, Cl 100, CO2 28, BUN 14, Cr 0.80, FOBT negative.  Imaging:  CXR: negative   EKG: 53 yo F history of mild intermittent asthma, epilepsy, HLD,  hypothyroidism, diabetes mellitus type 2, hx of GI bleed, bipolar disorder, obstructive sleep apnea, deviated septum, hypertension, GERD, sciatica of left foot, presented to the ED with complaints of weakness. States that she has been feeling very tired for quite some time and went to doctor who took her blood. PCP told her that her hemoglobin is low and that she needs to come to ED for blood transfusion. Patient denies SOB, CP, abdominal pain, headaches, dizziness, blood in stool. Pt's friend/employer is at bedside and provides some history.   In the ED, VS temp 99.7, HR 90, /72, RR 18, Sp02 96 rm air.  WBC 12.6, Hg 9.2, Hct 30.1, platelet 479, PT 11.7, INR 1.07, PTT 30.4, Na 135, K 4.3, Cl 100, CO2 28, BUN 14, Cr 0.80, FOBT negative. CXR: negative  EKG: NSR at 80 bpm.  Received solumedrol 125 mg iv , duonebs 3mL x1, and 1L NaCl 0.9% IV bolus x 1. 51 yo F history of mild intermittent asthma, epilepsy, HLD,  hypothyroidism, diabetes mellitus type 2, hx of GI bleed, bipolar disorder, obstructive sleep apnea, deviated septum, hypertension, GERD, sciatica of left foot, presented to the ED with complaints of weakness. States that she has been feeling very tired for quite some time and went to doctor who took her blood. PCP told her that her hemoglobin is low and that she needs to come to ED for blood transfusion. Patient denies SOB, CP, abdominal pain, headaches, dizziness, blood in stool. Pt states that she took her PM dose medications already. Pt's friend/employer is at bedside and provides some history as well.  In the ED, VS temp 99.7, HR 90, /72, RR 18, Sp02 96 rm air.  WBC 12.6, Hg 9.2, Hct 30.1, platelet 479, PT 11.7, INR 1.07, PTT 30.4, Na 135, K 4.3, Cl 100, CO2 28, BUN 14, Cr 0.80, FOBT negative. CXR: negative  EKG: NSR at 80 bpm.  Received solumedrol 125 mg iv , duonebs 3mL x1, and 1L NaCl 0.9% IV bolus x 1.

## 2018-03-15 NOTE — H&P ADULT - PROBLEM SELECTOR PLAN 4
Chronic, stable  Patient states that she gets seizures from taking iron.  continue home dose lamotrigine Chronic, stable  Patient states that she gets seizures from taking iron.  Continue home dose lamotrigine

## 2018-03-15 NOTE — H&P ADULT - PMH
Asthma in adult  mild intermittent asthma  Bipolar disorder    Deviated septum  snores  Diabetes mellitus  type 2  H/O heartburn    H/O: HTN (hypertension)    History of spinal stenosis    Obstructive sleep apnea    Sciatica of left side  with burning sensation left foot Asthma in adult  mild intermittent asthma  Bipolar disorder    Deviated septum  snores  Diabetes mellitus  type 2  Epilepsy    H/O heartburn    H/O: HTN (hypertension)    History of spinal stenosis    HLD (hyperlipidemia)    Obstructive sleep apnea    Sciatica of left side  with burning sensation left foot

## 2018-03-15 NOTE — H&P ADULT - NSHPREVIEWOFSYSTEMS_GEN_ALL_CORE
Constitutional: denies fever, chills, diaphoresis   HEENT: denies blurry vision, difficulty hearing  Respiratory: denies SOB, GOMEZ, cough, sputum production, wheezing, hemoptysis  Cardiovascular: denies CP, palpitations, edema  Gastrointestinal: denies nausea, vomiting, diarrhea, constipation, abdominal pain, melena, hematochezia   Genitourinary: denies dysuria, frequency, urgency, hematuria   Skin/Breast: denies rash, itching  Musculoskeletal: denies myalgias, joint swelling, muscle weakness  Neurologic: denies headache, weakness, dizziness, paresthesias, numbness/tingling  Psychiatric: denies feeling anxious, depressed, suicidal, homicidal thoughts  Hematology/Oncology: denies bruising, tender or enlarged lymph nodes   ROS negative except as noted above Constitutional: admits to weakness, denies fever, chills, diaphoresis   HEENT: denies blurry vision, difficulty hearing  Respiratory: admits to SOB, denies GOMEZ, cough, sputum production, wheezing, hemoptysis  Cardiovascular: denies CP, palpitations, edema  Gastrointestinal: denies nausea, vomiting, diarrhea, constipation, abdominal pain, melena, hematochezia   Genitourinary: denies dysuria, frequency, urgency, hematuria   Skin/Breast: denies rash, itching  Musculoskeletal: denies myalgias, joint swelling, muscle weakness  Neurologic: denies headache, weakness, dizziness, paresthesias, numbness/tingling  Psychiatric: denies feeling anxious, depressed, suicidal, homicidal thoughts  Hematology/Oncology: denies bruising, tender or enlarged lymph nodes   ROS negative except as noted above

## 2018-03-15 NOTE — H&P ADULT - PROBLEM SELECTOR PLAN 6
Chronic, stable  Continue levothyroxine 0.112 mg every other day, starting tomorrow 3/16/18  Continue levothyroxine  0.125 mg every other day, staring Saturday 3/17/18 Chronic, stable  Continue levothyroxine 0.112 mg every other day, starting tomorrow 3/16/18  Continue levothyroxine  0.125 mg every other day, staring Saturday 3/17/18  F/u thyroid panel

## 2018-03-15 NOTE — CONSULT NOTE ADULT - PROBLEM SELECTOR RECOMMENDATION 9
cpap night time  sleep hygiene discussed  weight loss  keep sat > 88 pct  discussed with the patient

## 2018-03-15 NOTE — H&P ADULT - NSHPPHYSICALEXAM_GEN_ALL_CORE
Physical Exam:  General: Well developed, well nourished, NAD  HEENT: NCAT, PERRLA, EOMI bl, moist mucous membranes   Neck: Supple, nontender, no mass  Neurology: A&Ox3, nonfocal, CN II-XII grossly intact, sensation intact, no gait abnormalities   Respiratory: CTA B/L, No W/R/R  CV: RRR, +S1/S2, no murmurs, rubs or gallops  Abdominal: Soft, NT, ND +BSx4  Extremities: No C/C/E, + peripheral pulses  MSK: Normal ROM, no joint erythema or warmth, no joint swelling   Skin: warm, dry, normal color, no rash or abnormal lesions Vital Signs Last 24 Hrs  T(C): 36.6 (15 Mar 2018 22:00), Max: 37.6 (15 Mar 2018 16:28)  T(F): 97.8 (15 Mar 2018 22:00), Max: 99.7 (15 Mar 2018 16:28)  HR: 80 (15 Mar 2018 22:00) (80 - 90)  BP: 108/85 (15 Mar 2018 22:00) (108/85 - 112/72)  BP(mean): --  RR: 16 (15 Mar 2018 22:00) (16 - 18)  SpO2: 99% (15 Mar 2018 22:00) (96% - 99%)    Physical Exam:  General: Well developed, well nourished, NAD  HEENT: NCAT, PERRLA, EOMI bl, moist mucous membranes   Neck: Supple, nontender, no mass  Neurology: A&Ox3, nonfocal, CN II-XII grossly intact, sensation intact  Respiratory: slight wheezes heard in left upper lung friend, no rales, crackles or rhonchi  CV: RRR, +S1/S2, no murmurs, rubs or gallops  Abdominal: Soft, NT, ND +BSx4  Extremities: No C/C/E, + peripheral pulses  MSK: Normal ROM, no joint erythema or warmth, no joint swelling   Skin: warm, dry, normal color, no rash or abnormal lesions

## 2018-03-15 NOTE — H&P ADULT - ASSESSMENT
51 yo F history of diabetes, hx of GI bleed bipolar disorder, obstructive sleep apnea, hypertension, 53 yo F history of diabetes, hx of GI bleed bipolar disorder, epilepsy, HLD, obstructive sleep apnea, hypothyroidism, hypertension presents from PMD office for blood transfusion for anemia and weakness. Admit to [facility] for symptomatic anemia.

## 2018-03-16 ENCOUNTER — TRANSCRIPTION ENCOUNTER (OUTPATIENT)
Age: 53
End: 2018-03-16

## 2018-03-16 VITALS
SYSTOLIC BLOOD PRESSURE: 126 MMHG | TEMPERATURE: 98 F | OXYGEN SATURATION: 93 % | HEART RATE: 76 BPM | DIASTOLIC BLOOD PRESSURE: 79 MMHG | RESPIRATION RATE: 17 BRPM

## 2018-03-16 DIAGNOSIS — E03.9 HYPOTHYROIDISM, UNSPECIFIED: ICD-10-CM

## 2018-03-16 DIAGNOSIS — J43.9 EMPHYSEMA, UNSPECIFIED: ICD-10-CM

## 2018-03-16 DIAGNOSIS — F31.71 BIPOLAR DISORDER, IN PARTIAL REMISSION, MOST RECENT EPISODE HYPOMANIC: ICD-10-CM

## 2018-03-16 DIAGNOSIS — E78.5 HYPERLIPIDEMIA, UNSPECIFIED: ICD-10-CM

## 2018-03-16 DIAGNOSIS — F17.200 NICOTINE DEPENDENCE, UNSPECIFIED, UNCOMPLICATED: ICD-10-CM

## 2018-03-16 DIAGNOSIS — F12.10 CANNABIS ABUSE, UNCOMPLICATED: ICD-10-CM

## 2018-03-16 DIAGNOSIS — G40.909 EPILEPSY, UNSPECIFIED, NOT INTRACTABLE, WITHOUT STATUS EPILEPTICUS: ICD-10-CM

## 2018-03-16 LAB
ALBUMIN SERPL ELPH-MCNC: 3.4 G/DL — SIGNIFICANT CHANGE UP (ref 3.3–5)
ALP SERPL-CCNC: 45 U/L — SIGNIFICANT CHANGE UP (ref 40–120)
ALT FLD-CCNC: 25 U/L — SIGNIFICANT CHANGE UP (ref 12–78)
ANION GAP SERPL CALC-SCNC: 9 MMOL/L — SIGNIFICANT CHANGE UP (ref 5–17)
AST SERPL-CCNC: 17 U/L — SIGNIFICANT CHANGE UP (ref 15–37)
BILIRUB SERPL-MCNC: 0.3 MG/DL — SIGNIFICANT CHANGE UP (ref 0.2–1.2)
BUN SERPL-MCNC: 10 MG/DL — SIGNIFICANT CHANGE UP (ref 7–23)
CALCIUM SERPL-MCNC: 8.8 MG/DL — SIGNIFICANT CHANGE UP (ref 8.5–10.1)
CHLORIDE SERPL-SCNC: 105 MMOL/L — SIGNIFICANT CHANGE UP (ref 96–108)
CHOLEST SERPL-MCNC: 188 MG/DL — SIGNIFICANT CHANGE UP (ref 10–199)
CO2 SERPL-SCNC: 25 MMOL/L — SIGNIFICANT CHANGE UP (ref 22–31)
CREAT SERPL-MCNC: 0.66 MG/DL — SIGNIFICANT CHANGE UP (ref 0.5–1.3)
CRP SERPL-MCNC: 0.2 MG/DL — SIGNIFICANT CHANGE UP (ref 0–0.4)
GLUCOSE SERPL-MCNC: 131 MG/DL — HIGH (ref 70–99)
HBA1C BLD-MCNC: 5.6 % — SIGNIFICANT CHANGE UP (ref 4–5.6)
HDLC SERPL-MCNC: 64 MG/DL — SIGNIFICANT CHANGE UP (ref 40–125)
LIPID PNL WITH DIRECT LDL SERPL: 115 MG/DL — SIGNIFICANT CHANGE UP
NT-PROBNP SERPL-SCNC: 67 PG/ML — SIGNIFICANT CHANGE UP (ref 0–125)
POTASSIUM SERPL-MCNC: 3.8 MMOL/L — SIGNIFICANT CHANGE UP (ref 3.5–5.3)
POTASSIUM SERPL-SCNC: 3.8 MMOL/L — SIGNIFICANT CHANGE UP (ref 3.5–5.3)
PROT SERPL-MCNC: 7.3 G/DL — SIGNIFICANT CHANGE UP (ref 6–8.3)
SODIUM SERPL-SCNC: 139 MMOL/L — SIGNIFICANT CHANGE UP (ref 135–145)
T3 SERPL-MCNC: 103 NG/DL — SIGNIFICANT CHANGE UP (ref 80–200)
T4 AB SER-ACNC: 9.3 UG/DL — SIGNIFICANT CHANGE UP (ref 4.6–12)
TOTAL CHOLESTEROL/HDL RATIO MEASUREMENT: 2.9 RATIO — LOW (ref 3.3–7.1)
TRIGL SERPL-MCNC: 44 MG/DL — SIGNIFICANT CHANGE UP (ref 10–149)
TSH SERPL-MCNC: 0.1 UIU/ML — LOW (ref 0.36–3.74)

## 2018-03-16 PROCEDURE — 99232 SBSQ HOSP IP/OBS MODERATE 35: CPT

## 2018-03-16 PROCEDURE — 71250 CT THORAX DX C-: CPT | Mod: 26

## 2018-03-16 PROCEDURE — 85730 THROMBOPLASTIN TIME PARTIAL: CPT

## 2018-03-16 PROCEDURE — 86901 BLOOD TYPING SEROLOGIC RH(D): CPT

## 2018-03-16 PROCEDURE — 80061 LIPID PANEL: CPT

## 2018-03-16 PROCEDURE — 90686 IIV4 VACC NO PRSV 0.5 ML IM: CPT

## 2018-03-16 PROCEDURE — 94640 AIRWAY INHALATION TREATMENT: CPT

## 2018-03-16 PROCEDURE — 86140 C-REACTIVE PROTEIN: CPT

## 2018-03-16 PROCEDURE — 80053 COMPREHEN METABOLIC PANEL: CPT

## 2018-03-16 PROCEDURE — 84436 ASSAY OF TOTAL THYROXINE: CPT

## 2018-03-16 PROCEDURE — 93005 ELECTROCARDIOGRAM TRACING: CPT

## 2018-03-16 PROCEDURE — 82272 OCCULT BLD FECES 1-3 TESTS: CPT

## 2018-03-16 PROCEDURE — 84480 ASSAY TRIIODOTHYRONINE (T3): CPT

## 2018-03-16 PROCEDURE — 83036 HEMOGLOBIN GLYCOSYLATED A1C: CPT

## 2018-03-16 PROCEDURE — 94660 CPAP INITIATION&MGMT: CPT

## 2018-03-16 PROCEDURE — 85027 COMPLETE CBC AUTOMATED: CPT

## 2018-03-16 PROCEDURE — 71045 X-RAY EXAM CHEST 1 VIEW: CPT

## 2018-03-16 PROCEDURE — 85018 HEMOGLOBIN: CPT

## 2018-03-16 PROCEDURE — 86900 BLOOD TYPING SEROLOGIC ABO: CPT

## 2018-03-16 PROCEDURE — 82962 GLUCOSE BLOOD TEST: CPT

## 2018-03-16 PROCEDURE — 83880 ASSAY OF NATRIURETIC PEPTIDE: CPT

## 2018-03-16 PROCEDURE — 96374 THER/PROPH/DIAG INJ IV PUSH: CPT

## 2018-03-16 PROCEDURE — 84443 ASSAY THYROID STIM HORMONE: CPT

## 2018-03-16 PROCEDURE — 99285 EMERGENCY DEPT VISIT HI MDM: CPT | Mod: 25

## 2018-03-16 PROCEDURE — 80175 DRUG SCREEN QUAN LAMOTRIGINE: CPT

## 2018-03-16 PROCEDURE — 80307 DRUG TEST PRSMV CHEM ANLYZR: CPT

## 2018-03-16 PROCEDURE — G0378: CPT

## 2018-03-16 PROCEDURE — 85610 PROTHROMBIN TIME: CPT

## 2018-03-16 PROCEDURE — 99239 HOSP IP/OBS DSCHRG MGMT >30: CPT

## 2018-03-16 PROCEDURE — 97161 PT EVAL LOW COMPLEX 20 MIN: CPT

## 2018-03-16 PROCEDURE — 93970 EXTREMITY STUDY: CPT

## 2018-03-16 PROCEDURE — 86850 RBC ANTIBODY SCREEN: CPT

## 2018-03-16 PROCEDURE — 71250 CT THORAX DX C-: CPT

## 2018-03-16 RX ORDER — OLANZAPINE 15 MG/1
1 TABLET, FILM COATED ORAL
Qty: 0 | Refills: 0 | COMMUNITY

## 2018-03-16 RX ORDER — METFORMIN HYDROCHLORIDE 850 MG/1
0 TABLET ORAL
Qty: 0 | Refills: 0 | COMMUNITY

## 2018-03-16 RX ORDER — LINAGLIPTIN 5 MG/1
1 TABLET, FILM COATED ORAL
Qty: 0 | Refills: 0 | COMMUNITY

## 2018-03-16 RX ORDER — CITALOPRAM 10 MG/1
60 TABLET, FILM COATED ORAL
Qty: 0 | Refills: 0 | COMMUNITY

## 2018-03-16 RX ORDER — PREGABALIN 225 MG/1
1000 CAPSULE ORAL ONCE
Qty: 0 | Refills: 0 | Status: COMPLETED | OUTPATIENT
Start: 2018-03-16 | End: 2018-03-16

## 2018-03-16 RX ORDER — LEVOTHYROXINE SODIUM 125 MCG
0 TABLET ORAL
Qty: 0 | Refills: 0 | COMMUNITY

## 2018-03-16 RX ORDER — PREGABALIN 225 MG/1
100 CAPSULE ORAL ONCE
Qty: 0 | Refills: 0 | Status: DISCONTINUED | OUTPATIENT
Start: 2018-03-16 | End: 2018-03-16

## 2018-03-16 RX ORDER — LAMOTRIGINE 25 MG/1
1 TABLET, ORALLY DISINTEGRATING ORAL
Qty: 0 | Refills: 0 | COMMUNITY

## 2018-03-16 RX ORDER — ALBUTEROL 90 UG/1
0 AEROSOL, METERED ORAL
Qty: 0 | Refills: 0 | COMMUNITY

## 2018-03-16 RX ORDER — OMEPRAZOLE 10 MG/1
1 CAPSULE, DELAYED RELEASE ORAL
Qty: 0 | Refills: 0 | COMMUNITY

## 2018-03-16 RX ORDER — LISINOPRIL 2.5 MG/1
1 TABLET ORAL
Qty: 0 | Refills: 0 | COMMUNITY

## 2018-03-16 RX ORDER — LAMOTRIGINE 25 MG/1
200 TABLET, ORALLY DISINTEGRATING ORAL AT BEDTIME
Qty: 0 | Refills: 0 | Status: DISCONTINUED | OUTPATIENT
Start: 2018-03-16 | End: 2018-03-16

## 2018-03-16 RX ORDER — INFLUENZA VIRUS VACCINE 15; 15; 15; 15 UG/.5ML; UG/.5ML; UG/.5ML; UG/.5ML
0.5 SUSPENSION INTRAMUSCULAR ONCE
Qty: 0 | Refills: 0 | Status: COMPLETED | OUTPATIENT
Start: 2018-03-16 | End: 2018-03-16

## 2018-03-16 RX ORDER — LEVOTHYROXINE SODIUM 125 MCG
1 TABLET ORAL
Qty: 0 | Refills: 0 | COMMUNITY

## 2018-03-16 RX ORDER — FLUTICASONE PROPIONATE AND SALMETEROL 50; 250 UG/1; UG/1
0 POWDER ORAL; RESPIRATORY (INHALATION)
Qty: 0 | Refills: 0 | COMMUNITY

## 2018-03-16 RX ADMIN — INFLUENZA VIRUS VACCINE 0.5 MILLILITER(S): 15; 15; 15; 15 SUSPENSION INTRAMUSCULAR at 12:10

## 2018-03-16 RX ADMIN — Medication 112 MICROGRAM(S): at 06:20

## 2018-03-16 RX ADMIN — Medication 1 TABLET(S): at 12:14

## 2018-03-16 RX ADMIN — BUDESONIDE AND FORMOTEROL FUMARATE DIHYDRATE 2 PUFF(S): 160; 4.5 AEROSOL RESPIRATORY (INHALATION) at 06:20

## 2018-03-16 RX ADMIN — LISINOPRIL 20 MILLIGRAM(S): 2.5 TABLET ORAL at 06:20

## 2018-03-16 RX ADMIN — PREGABALIN 1000 MICROGRAM(S): 225 CAPSULE ORAL at 11:54

## 2018-03-16 RX ADMIN — Medication 25 MILLIGRAM(S): at 06:20

## 2018-03-16 RX ADMIN — Medication 150 MILLIGRAM(S): at 06:20

## 2018-03-16 RX ADMIN — PANTOPRAZOLE SODIUM 40 MILLIGRAM(S): 20 TABLET, DELAYED RELEASE ORAL at 06:20

## 2018-03-16 NOTE — CONSULT NOTE ADULT - SUBJECTIVE AND OBJECTIVE BOX
Patient is a 52y old  Female who presents with a chief complaint of "back pain." (16 Mar 2018 09:06)      HPI:  51 yo F history of mild intermittent asthma, epilepsy, HLD,  hypothyroidism, diabetes mellitus type 2, hx of GI bleed, bipolar disorder, obstructive sleep apnea, deviated septum, hypertension, GERD, sciatica of left foot, presented to the ED with complaints of weakness. States that she has been feeling very tired for quite some time and went to doctor who took her blood. PCP told her that her hemoglobin is low and that she needs to come to ED for blood transfusion. Patient denies SOB, CP, abdominal pain, headaches, dizziness, blood in stool. Pt states that she took her PM dose medications already. Pt's friend/employer is at bedside and provides some history as well.  In the ED, VS temp 99.7, HR 90, /72, RR 18, Sp02 96 rm air.  WBC 12.6, Hg 9.2, Hct 30.1, platelet 479, PT 11.7, INR 1.07, PTT 30.4, Na 135, K 4.3, Cl 100, CO2 28, BUN 14, Cr 0.80, FOBT negative. CXR: negative  EKG: NSR at 80 bpm.  Received solumedrol 125 mg iv , duonebs 3mL x1, and 1L NaCl 0.9% IV bolus x 1. (15 Mar 2018 22:26)    Heme asked to see pt for for anemia.   Pt is unreliable historian. Hx per chart, notes, and pt report.   Has had of FOBT+ stools and chronically anemic. Seen by PMD (Ashley) and referrd to ER per pt "for txfusion for severe anemia." Pt denies seeing hematologist prior. Pt   GI Dr Laird.         PAST MEDICAL & SURGICAL HISTORY:  Epilepsy  HLD (hyperlipidemia)  Obstructive sleep apnea  Deviated septum: snores  Sciatica of left side: with burning sensation left foot  Asthma in adult: mild intermittent asthma  H/O heartburn  Diabetes mellitus: type 2  Bipolar disorder  H/O: HTN (hypertension)  History of spinal stenosis  H/O colonoscopy  H/O unilateral oophorectomy: left ovary 2004  S/P partial hysterectomy: age 35        HEALTH ISSUES - PROBLEM Dx:  Cannabis abuse  Bipolar disorder, in partial remission, most recent episode hypomanic  Emphysema: Emphysema  Smoker: Smoker  Hyperlipidemia: Hyperlipidemia  Hypothyroidism: Hypothyroidism  Epilepsy: Epilepsy  Need for prophylactic measure: Need for prophylactic measure  Bipolar disorder: Bipolar disorder  Diabetes mellitus: Diabetes mellitus  H/O: HTN (hypertension): H/O: HTN (hypertension)  Anemia: Anemia  Obesity: Obesity  Asthma in adult: Asthma in adult  Obstructive sleep apnea: Obstructive sleep apnea            FAMILY HISTORY:  Family history of hypercholesterolemia (Sibling)  Family history of prostate cancer  Family history of renal failure        [SOCIAL HISTORY: ]  smoking:   EtOH:   illicit drugs:   occupation:   marital status:       [ALLERGIES/INTOLERANCES:]  Allergies    Iron 100 (Seizure)    Intolerances          MEDICATIONS  (STANDING):  atorvastatin 10 milliGRAM(s) Oral at bedtime  buDESOnide 160 MICROgram(s)/formoterol 4.5 MICROgram(s) Inhaler 2 Puff(s) Inhalation two times a day  citalopram 60 milliGRAM(s) Oral at bedtime  cyanocobalamin Injectable 1000 MICROGram(s) IntraMuscular once  dextrose 5%. 1000 milliLiter(s) (50 mL/Hr) IV Continuous <Continuous>  dextrose 50% Injectable 12.5 Gram(s) IV Push once  dextrose 50% Injectable 25 Gram(s) IV Push once  dextrose 50% Injectable 25 Gram(s) IV Push once  hydrochlorothiazide 25 milliGRAM(s) Oral daily  influenza   Vaccine 0.5 milliLiter(s) IntraMuscular once  insulin lispro (HumaLOG) corrective regimen sliding scale   SubCutaneous Before meals and at bedtime  lamoTRIgine 200 milliGRAM(s) Oral at bedtime  levothyroxine 112 MICROGram(s) Oral <User Schedule>  lisinopril 20 milliGRAM(s) Oral daily  multivitamin 1 Tablet(s) Oral daily  OLANZapine 5 milliGRAM(s) Oral at bedtime  pantoprazole    Tablet 40 milliGRAM(s) Oral before breakfast  pregabalin 150 milliGRAM(s) Oral two times a day    MEDICATIONS  (PRN):  ALBUTerol    90 MICROgram(s) HFA Inhaler 2 Puff(s) Inhalation every 6 hours PRN Shortness of Breath and/or Wheezing  dextrose Gel 1 Dose(s) Oral once PRN Blood Glucose LESS THAN 70 milliGRAM(s)/deciliter  glucagon  Injectable 1 milliGRAM(s) IntraMuscular once PRN Glucose LESS THAN 70 milligrams/deciliter        [REVIEW OF SYSTEMS: ]  CONSTITUTIONAL: normal   EYES: No eye pain, no visual disturbances, no discharge  ENMT:  no discharge  NECK: No pain, no stiffness  BREASTS: No pain, no masses, no nipple discharge  RESPIRATORY: No cough, no wheezing, no chills, no hemoptysis; No shortness of breath  CARDIOVASCULAR: No chest pain, no palpitations, no dizziness, no leg swelling  GASTROINTESTINAL: No abdominal or epigastric pain. No nausea, no vomiting, no hematemesis; No diarrhea , no constipation. No melena, no hematochezia.  GENITOURINARY: No dysuria, no frequency, no hematuria, no incontinence  NEUROLOGICAL: No headaches, no memory loss, no loss of strength, no numbness, no tremors  SKIN: No itching, no burning, no rashes, no lesions   LYMPH NODES: No enlarged glands  ENDOCRINE: No heat or cold intolerance; No hair loss  MUSCULOSKELETAL: No joint pain or swelling; No muscle, no back, or extremity pain  PSYCHIATRIC: No depression, no anxiety, no mood swings, no difficulty sleeping  HEME/LYMPH: No easy bruising, no bleeding gums      Vital Signs Last 24 Hrs  T(C): 36.6 (16 Mar 2018 04:55), Max: 37.6 (15 Mar 2018 16:28)  T(F): 97.9 (16 Mar 2018 04:55), Max: 99.7 (15 Mar 2018 16:28)  HR: 79 (16 Mar 2018 04:55) (79 - 90)  BP: 112/68 (16 Mar 2018 04:55) (108/85 - 131/75)  BP(mean): --  RR: 17 (16 Mar 2018 04:55) (14 - 18)  SpO2: 92% (16 Mar 2018 04:55) (92% - 99%)    Last Menstrual Period      I&O's Summary        [PHYSICAL EXAM]  General: WN, WD, adult in NAD  HEENT: clear oropharynx, anicteric sclera, pink conjunctivae  Neck: supple, no thryoid mass  CV: normal S1S2 RRR, nomurmur, no rubs, no gallops  Lungs: clear to auscultation BL, no wheezes, no rales, no rhonchi  Abdomen: soft non-tender non-distended, no hepatomegaly, no splenomegaly  Ext: no clubbing, no cyanosis, no edema  Skin: no rashes, no petichiae  Neuro: alert and oriented X3, no focal deficits      [LABS:]                        9.3    x     )-----------( x        ( 16 Mar 2018 07:42 )             30.8     CBC Full  -  ( 16 Mar 2018 07:42 )  WBC Count : x  Hemoglobin : 9.3 g/dL  Hematocrit : 30.8 %  Platelet Count - Automated : x  Mean Cell Volume : x  Mean Cell Hemoglobin : x  Mean Cell Hemoglobin Concentration : x  Auto Neutrophil # : x  Auto Lymphocyte # : x  Auto Monocyte # : x  Auto Eosinophil # : x  Auto Basophil # : x  Auto Neutrophil % : x  Auto Lymphocyte % : x  Auto Monocyte % : x  Auto Eosinophil % : x  Auto Basophil % : x    03-16    139  |  105  |  10  ----------------------------<  131<H>  3.8   |  25  |  0.66    Ca    8.8      16 Mar 2018 07:03    TPro  7.3  /  Alb  3.4  /  TBili  0.3  /  DBili  x   /  AST  17  /  ALT  25  /  AlkPhos  45  03-16    PT/INR - ( 15 Mar 2018 17:05 )   PT: 11.7 sec;   INR: 1.07 ratio         PTT - ( 15 Mar 2018 17:05 )  PTT:30.4 sec  LIVER FUNCTIONS - ( 16 Mar 2018 07:03 )  Alb: 3.4 g/dL / Pro: 7.3 g/dL / ALK PHOS: 45 U/L / ALT: 25 U/L / AST: 17 U/L / GGT: x                     LAB TRENDS    WBC  TREND (5 Days)  WBC Count: 12.6 K/uL (03-15 @ 17:05)    HGB  TREND (5 Days)  Hemoglobin: 9.3 g/dL (03-16 @ 07:42)  Hemoglobin: 9.2 g/dL (03-15 @ 17:05)    HCT  TREND (5 Days)  Hematocrit: 30.8 % (03-16 @ 07:42)  Hematocrit: 30.1 % (03-15 @ 17:05)    PLT  TREND (5 Days)  Platelet Count - Automated: 479 K/uL (03-15 @ 17:05)        Anemia Studies                      [BLOOD SMEAR INTERPRETATION: ]  RBC:   WBC:   Plts:       [RADIOLOGY & ADDITIONAL STUDIES:] Patient is a 52y old  Female who presents with a chief complaint of "back pain." (16 Mar 2018 09:06)      HPI:  53 yo F history of mild intermittent asthma, epilepsy, HLD,  hypothyroidism, diabetes mellitus type 2, hx of GI bleed, bipolar disorder, obstructive sleep apnea, deviated septum, hypertension, GERD, sciatica of left foot, presented to the ED with complaints of weakness. States that she has been feeling very tired for quite some time and went to doctor who took her blood. PCP told her that her hemoglobin is low and that she needs to come to ED for blood transfusion. Patient denies SOB, CP, abdominal pain, headaches, dizziness, blood in stool. Pt states that she took her PM dose medications already. Pt's friend/employer is at bedside and provides some history as well.  In the ED, VS temp 99.7, HR 90, /72, RR 18, Sp02 96 rm air.  WBC 12.6, Hg 9.2, Hct 30.1, platelet 479, PT 11.7, INR 1.07, PTT 30.4, Na 135, K 4.3, Cl 100, CO2 28, BUN 14, Cr 0.80, FOBT negative. CXR: negative  EKG: NSR at 80 bpm.  Received solumedrol 125 mg iv , duonebs 3mL x1, and 1L NaCl 0.9% IV bolus x 1. (15 Mar 2018 22:26)    Heme asked to see pt for for anemia.   Pt is unreliable historian. Hx per chart, notes, and pt report. Pt admitted b/c told ER MD that she was dizzy and lightheaded, and with hx of FOBT stools, low Hgb but stable compared to several months ago.   Has had of FOBT+ stools and chronically anemic. Seen by PMD (Ashley) and referrd to ER per pt "for txfusion for severe anemia." Pt's outpt GI Dr Laird. Pt later showed letter from MD which states pt has anemia, and needs evaluation at hospital for anemia.   Pt [place] seeing hematologist in [place], alludes saw one in [place] prior.    Pt states she is allergic to broccoli as it may have cause her to have seizures in past, and therefore cannot take iron  Pt reports that she has never taken iron pills as a result. Denies taking FeOS4. Does not "take any iron at all as a result."       I advised her that it is unlikely she is allergic to iron in general, as her own body has iron. She eats meat (which she was evasive about answering), which has iron. So therefore she is not allergic to iron itself. Discussed then that we can give other preparations via veins, IV iron and can be observed in hospital if she is agreeable. She is not, insists on transfusion.        Discussed that there is no current indication for blood transfusion, as more risk than benefits, in addition blood has iron. However, she showed persistence in thought despite attempts to reason with pt. "Maintains I cannot take iron" without rationale, not acknowledgement / belief that meat has iron. Pt gives history, albeit questionable that she had a iron infusion in [place] by hematologist. Again pointed out that is she had before then why cant she have again.   States they don't give any more as "they do not give it to patients if they have thyroid problems. " Advised this is not the case for IV iron but can have interaction with oral iron.       However persists for RBC transfusion, and now that we advised her no indication for transfusion, then accusatory that we are accusing her physician of being inept. I advised pt that her physician may have had different situation when she was seen in his office.       Advised current data does not indicate need for transfusion. Again now persists that she needs transfusion. She denies blood in stool, denies melena. Pt then became upset when it became clear that no transfusion would be ordered, and started using expletives. Pt had been using expletives through morning since admitted overnight, prior to my evaluation.   Pt seen by psychiatry this AM.     Hgb 9.0 on 04/15/16, FOBT+  Hgb 9.6 on 02/07/18  Hgb 9.2 on 03/15/18, FOBT negative.         PAST MEDICAL & SURGICAL HISTORY:  Epilepsy  HLD (hyperlipidemia)  Obstructive sleep apnea  Deviated septum: snores  Sciatica of left side: with burning sensation left foot  Asthma in adult: mild intermittent asthma  H/O heartburn  Diabetes mellitus: type 2  Bipolar disorder  H/O: HTN (hypertension)  History of spinal stenosis  H/O colonoscopy  H/O unilateral oophorectomy: left ovary 2004  S/P partial hysterectomy: age 35        HEALTH ISSUES - PROBLEM Dx:  Cannabis abuse  Bipolar disorder, in partial remission, most recent episode hypomanic  Emphysema: Emphysema  Smoker: Smoker  Hyperlipidemia: Hyperlipidemia  Hypothyroidism: Hypothyroidism  Epilepsy: Epilepsy  Need for prophylactic measure: Need for prophylactic measure  Bipolar disorder: Bipolar disorder  Diabetes mellitus: Diabetes mellitus  H/O: HTN (hypertension): H/O: HTN (hypertension)  Anemia: Anemia  Obesity: Obesity  Asthma in adult: Asthma in adult  Obstructive sleep apnea: Obstructive sleep apnea            FAMILY HISTORY:  Family history of hypercholesterolemia (Sibling)  Family history of prostate cancer  Family history of renal failure        [SOCIAL HISTORY: ]  smoking: marijuana. 2 PPD x35yrs  EtOH: denies  illicit drugs: marijuana  occupation: , lives with family friend  marital status: single      [ALLERGIES/INTOLERANCES:]  Allergies      Iron 100 (Seizure)  Intolerances          MEDICATIONS  (STANDING):  atorvastatin 10 milliGRAM(s) Oral at bedtime  buDESOnide 160 MICROgram(s)/formoterol 4.5 MICROgram(s) Inhaler 2 Puff(s) Inhalation two times a day  citalopram 60 milliGRAM(s) Oral at bedtime  cyanocobalamin Injectable 1000 MICROGram(s) IntraMuscular once  dextrose 5%. 1000 milliLiter(s) (50 mL/Hr) IV Continuous <Continuous>  dextrose 50% Injectable 12.5 Gram(s) IV Push once  dextrose 50% Injectable 25 Gram(s) IV Push once  dextrose 50% Injectable 25 Gram(s) IV Push once  hydrochlorothiazide 25 milliGRAM(s) Oral daily  influenza   Vaccine 0.5 milliLiter(s) IntraMuscular once  insulin lispro (HumaLOG) corrective regimen sliding scale   SubCutaneous Before meals and at bedtime  lamoTRIgine 200 milliGRAM(s) Oral at bedtime  levothyroxine 112 MICROGram(s) Oral <User Schedule>  lisinopril 20 milliGRAM(s) Oral daily  multivitamin 1 Tablet(s) Oral daily  OLANZapine 5 milliGRAM(s) Oral at bedtime  pantoprazole    Tablet 40 milliGRAM(s) Oral before breakfast  pregabalin 150 milliGRAM(s) Oral two times a day    MEDICATIONS  (PRN):  ALBUTerol    90 MICROgram(s) HFA Inhaler 2 Puff(s) Inhalation every 6 hours PRN Shortness of Breath and/or Wheezing  dextrose Gel 1 Dose(s) Oral once PRN Blood Glucose LESS THAN 70 milliGRAM(s)/deciliter  glucagon  Injectable 1 milliGRAM(s) IntraMuscular once PRN Glucose LESS THAN 70 milligrams/deciliter        [REVIEW OF SYSTEMS: ]  CONSTITUTIONAL: normal   EYES: No eye pain, no visual disturbances, no discharge  ENMT:  no discharge  NECK: No pain, no stiffness  BREASTS: No pain, no masses, no nipple discharge  RESPIRATORY: No cough, no wheezing, no chills, no hemoptysis; No shortness of breath  CARDIOVASCULAR: No chest pain, no palpitations, no dizziness, no leg swelling  GASTROINTESTINAL: No abdominal or epigastric pain. No nausea, no vomiting, no hematemesis; No diarrhea , no constipation. No melena, no hematochezia.  GENITOURINARY: No dysuria, no frequency, no hematuria, no incontinence  NEUROLOGICAL: No headaches, no memory loss, no loss of strength, no numbness, no tremors  SKIN: No itching, no burning, no rashes, no lesions   LYMPH NODES: No enlarged glands  ENDOCRINE: No heat or cold intolerance; No hair loss  MUSCULOSKELETAL: No joint pain or swelling; No muscle, no back, or extremity pain  PSYCHIATRIC: No depression, no anxiety, +mood swings, no difficulty sleeping  HEME/LYMPH: No easy bruising, no bleeding gums      Vital Signs Last 24 Hrs  T(C): 36.6 (16 Mar 2018 04:55), Max: 37.6 (15 Mar 2018 16:28)  T(F): 97.9 (16 Mar 2018 04:55), Max: 99.7 (15 Mar 2018 16:28)  HR: 79 (16 Mar 2018 04:55) (79 - 90)  BP: 112/68 (16 Mar 2018 04:55) (108/85 - 131/75)  BP(mean): --  RR: 17 (16 Mar 2018 04:55) (14 - 18)  SpO2: 92% (16 Mar 2018 04:55) (92% - 99%)      [PHYSICAL EXAM]  General: WN, WD, adult in NAD  HEENT: clear oropharynx, anicteric sclera, pink conjunctivae  Neck: supple, no thyroid mass  CV: normal S1S2 RRR, no mrmur, no rubs, no gallops  Lungs: clear to auscultation BL, no wheezes, no rales, no rhonchi  Abdomen: soft non-tender non-distended, no hepatomegaly, no splenomegaly  Ext: no clubbing, no cyanosis, no edema  Skin: no rashes, no petechiae  Neuro: alert and oriented X3, no focal deficits      [LABS:]                        9.3    x     )-----------( x        ( 16 Mar 2018 07:42 )             30.8     CBC Full  -  ( 16 Mar 2018 07:42 )  WBC Count : x  Hemoglobin : 9.3 g/dL  Hematocrit : 30.8 %  Platelet Count - Automated : x  Mean Cell Volume : x  Mean Cell Hemoglobin : x  Mean Cell Hemoglobin Concentration : x  Auto Neutrophil # : x  Auto Lymphocyte # : x  Auto Monocyte # : x  Auto Eosinophil # : x  Auto Basophil # : x  Auto Neutrophil % : x  Auto Lymphocyte % : x  Auto Monocyte % : x  Auto Eosinophil % : x  Auto Basophil % : x    03-16    139  |  105  |  10  ----------------------------<  131<H>  3.8   |  25  |  0.66    Ca    8.8      16 Mar 2018 07:03    TPro  7.3  /  Alb  3.4  /  TBili  0.3  /  DBili  x   /  AST  17  /  ALT  25  /  AlkPhos  45  03-16    PT/INR - ( 15 Mar 2018 17:05 )   PT: 11.7 sec;   INR: 1.07 ratio         PTT - ( 15 Mar 2018 17:05 )  PTT:30.4 sec  LIVER FUNCTIONS - ( 16 Mar 2018 07:03 )  Alb: 3.4 g/dL / Pro: 7.3 g/dL / ALK PHOS: 45 U/L / ALT: 25 U/L / AST: 17 U/L / GGT: x               LAB TRENDS    WBC  TREND (5 Days)  WBC Count: 12.6 K/uL (03-15 @ 17:05)    HGB  TREND (5 Days)  Hemoglobin: 9.3 g/dL (03-16 @ 07:42)  Hemoglobin: 9.2 g/dL (03-15 @ 17:05)    HCT  TREND (5 Days)  Hematocrit: 30.8 % (03-16 @ 07:42)  Hematocrit: 30.1 % (03-15 @ 17:05)    PLT  TREND (5 Days)  Platelet Count - Automated: 479 K/uL (03-15 @ 17:05)                    [BLOOD SMEAR INTERPRETATION: ]  RBC:   WBC:   Plts:       [RADIOLOGY & ADDITIONAL STUDIES:]  < from: CT Chest No Cont (03.16.18 @ 02:40) >  EXAM:  CT CHEST                        PROCEDURE DATE:  03/16/2018    INTERPRETATION:  History: Smoker, COPD.  Noncontrast chest CT.    Biapical fibrous parenchymal stranding and small blebs. No focal consolidation or suspicious nodularity.  No pleural collections.  Central airways patent. No mediastinal adenopathy. Hilar evaluation limited in the absence of IV contrast.  Normal heart size with coronary artery calcination. No pericardial abnormality.  Bilateral gynecomastia. Nonspecific right breast calcification.  Upper abdomen not remarkable. Osseous structures not remarkable.    Impression:    Biapical parenchymal scarring and blebs. Otherwise unremarkable   noncontrast study. No acute diagnostic findings.                NEHA WAGGONER M.D., ATTENDING RADIOLOGIST  This document has been electronically signed. Mar 16 2018  8:27AM                < end of copied text > Patient is a 52y old  Female who presents with a chief complaint of "back pain." (16 Mar 2018 09:06)      HPI:  53 yo F history of mild intermittent asthma, epilepsy, HLD,  hypothyroidism, diabetes mellitus type 2, hx of GI bleed, bipolar disorder, obstructive sleep apnea, deviated septum, hypertension, GERD, sciatica of left foot, presented to the ED with complaints of weakness. States that she has been feeling very tired for quite some time and went to doctor who took her blood. PCP told her that her hemoglobin is low and that she needs to come to ED for blood transfusion. Patient denies SOB, CP, abdominal pain, headaches, dizziness, blood in stool. Pt states that she took her PM dose medications already. Pt's friend/employer is at bedside and provides some history as well.  In the ED, VS temp 99.7, HR 90, /72, RR 18, Sp02 96 rm air.  WBC 12.6, Hg 9.2, Hct 30.1, platelet 479, PT 11.7, INR 1.07, PTT 30.4, Na 135, K 4.3, Cl 100, CO2 28, BUN 14, Cr 0.80, FOBT negative. CXR: negative  EKG: NSR at 80 bpm.  Received solumedrol 125 mg iv , duonebs 3mL x1, and 1L NaCl 0.9% IV bolus x 1. (15 Mar 2018 22:26)    Heme asked to see pt for for anemia.   Pt is unreliable historian. Hx per chart, notes, and pt report. Pt admitted b/c told ER MD that she was dizzy and lightheaded, and with hx of FOBT stools, low Hgb but stable compared to several months ago.   Has had of FOBT+ stools and chronically anemic. Seen by PMD (Ashley) and referrd to ER per pt "for txfusion for severe anemia." Pt's outpt GI Dr Laird. Pt later showed letter from MD which states pt has anemia, and needs evaluation at hospital for anemia.   Pt [place] seeing [place]ologist in [place], alludes saw one in [place] prior.    Pt states she is allergic to broccoli as it may have cause her to have seizures in past, and therefore cannot take iron  Pt reports that she has never taken iron pills as a result. Denies taking FeOS4. Does not "take any iron at all as a result."       I advised her that it is unlikely she is allergic to iron in general, as her own body has iron. She eats meat (which she was evasive about answering), which has iron. So therefore she is not allergic to iron itself. Discussed then that we can give other preparations via veins, IV iron and can be observed in hospital if she is agreeable. She is not, insists on transfusion.        Discussed that there is no current indication for blood transfusion, as more risk than benefits, in addition blood has iron. However, she showed persistence in thought despite attempts to reason with pt. "Maintains I cannot take iron" without rationale, not acknowledgement / belief that meat has iron. Pt gives history, albeit questionable that she had a iron infusion in [place] by hematologist. Again pointed out that is she had before then why cant she have again.   States they don't give any more as "they do not give it to patients if they have thyroid problems. " Advised this is not the case for IV iron but can have interaction with oral iron.       However persists for RBC transfusion, and now that we advised her no indication for transfusion, then accusatory that we are accusing her physician of being inept. Pt was advised that her physician may have had different clinical situation when she was seen in his office.       Advised current data does not indicate need for transfusion. Again now persists that she needs transfusion. She denies blood in stool, denies melena. Pt then became upset when it became clear that no transfusion would be ordered, and started using expletives. Pt had been using expletives through morning since admitted overnight, prior to my evaluation.   Pt seen by psychiatry this AM.     Hgb 9.0 on 04/15/16, FOBT+  Hgb 9.6 on 02/07/18  Hgb 9.2 on 03/15/18, FOBT negative.         PAST MEDICAL & SURGICAL HISTORY:  Epilepsy  HLD (hyperlipidemia)  Obstructive sleep apnea  Deviated septum: snores  Sciatica of left side: with burning sensation left foot  Asthma in adult: mild intermittent asthma  H/O heartburn  Diabetes mellitus: type 2  Bipolar disorder  H/O: HTN (hypertension)  History of spinal stenosis  H/O colonoscopy  H/O unilateral oophorectomy: left ovary 2004  S/P partial hysterectomy: age 35        HEALTH ISSUES - PROBLEM Dx:  Cannabis abuse  Bipolar disorder, in partial remission, most recent episode hypomanic  Emphysema: Emphysema  Smoker: Smoker  Hyperlipidemia: Hyperlipidemia  Hypothyroidism: Hypothyroidism  Epilepsy: Epilepsy  Need for prophylactic measure: Need for prophylactic measure  Bipolar disorder: Bipolar disorder  Diabetes mellitus: Diabetes mellitus  H/O: HTN (hypertension): H/O: HTN (hypertension)  Anemia: Anemia  Obesity: Obesity  Asthma in adult: Asthma in adult  Obstructive sleep apnea: Obstructive sleep apnea            FAMILY HISTORY:  Family history of hypercholesterolemia (Sibling)  Family history of prostate cancer  Family history of renal failure        [SOCIAL HISTORY: ]  smoking: marijuana. 2 PPD x35yrs  EtOH: denies  illicit drugs: marijuana  occupation: , lives with family friend  marital status: single      [ALLERGIES/INTOLERANCES:]  Allergies      Iron 100 (Seizure)  Intolerances          MEDICATIONS  (STANDING):  atorvastatin 10 milliGRAM(s) Oral at bedtime  buDESOnide 160 MICROgram(s)/formoterol 4.5 MICROgram(s) Inhaler 2 Puff(s) Inhalation two times a day  citalopram 60 milliGRAM(s) Oral at bedtime  cyanocobalamin Injectable 1000 MICROGram(s) IntraMuscular once  dextrose 5%. 1000 milliLiter(s) (50 mL/Hr) IV Continuous <Continuous>  dextrose 50% Injectable 12.5 Gram(s) IV Push once  dextrose 50% Injectable 25 Gram(s) IV Push once  dextrose 50% Injectable 25 Gram(s) IV Push once  hydrochlorothiazide 25 milliGRAM(s) Oral daily  influenza   Vaccine 0.5 milliLiter(s) IntraMuscular once  insulin lispro (HumaLOG) corrective regimen sliding scale   SubCutaneous Before meals and at bedtime  lamoTRIgine 200 milliGRAM(s) Oral at bedtime  levothyroxine 112 MICROGram(s) Oral <User Schedule>  lisinopril 20 milliGRAM(s) Oral daily  multivitamin 1 Tablet(s) Oral daily  OLANZapine 5 milliGRAM(s) Oral at bedtime  pantoprazole    Tablet 40 milliGRAM(s) Oral before breakfast  pregabalin 150 milliGRAM(s) Oral two times a day    MEDICATIONS  (PRN):  ALBUTerol    90 MICROgram(s) HFA Inhaler 2 Puff(s) Inhalation every 6 hours PRN Shortness of Breath and/or Wheezing  dextrose Gel 1 Dose(s) Oral once PRN Blood Glucose LESS THAN 70 milliGRAM(s)/deciliter  glucagon  Injectable 1 milliGRAM(s) IntraMuscular once PRN Glucose LESS THAN 70 milligrams/deciliter        [REVIEW OF SYSTEMS: ]  CONSTITUTIONAL: normal   EYES: No eye pain, no visual disturbances, no discharge  ENMT:  no discharge  NECK: No pain, no stiffness  BREASTS: No pain, no masses, no nipple discharge  RESPIRATORY: No cough, no wheezing, no chills, no hemoptysis; No shortness of breath  CARDIOVASCULAR: No chest pain, no palpitations, no dizziness, no leg swelling  GASTROINTESTINAL: No abdominal or epigastric pain. No nausea, no vomiting, no hematemesis; No diarrhea , no constipation. No melena, no hematochezia.  GENITOURINARY: No dysuria, no frequency, no hematuria, no incontinence  NEUROLOGICAL: No headaches, no memory loss, no loss of strength, no numbness, no tremors  SKIN: No itching, no burning, no rashes, no lesions   LYMPH NODES: No enlarged glands  ENDOCRINE: No heat or cold intolerance; No hair loss  MUSCULOSKELETAL: No joint pain or swelling; No muscle, no back, or extremity pain  PSYCHIATRIC: No depression, no anxiety, +mood swings, no difficulty sleeping  HEME/LYMPH: No easy bruising, no bleeding gums      Vital Signs Last 24 Hrs  T(C): 36.6 (16 Mar 2018 04:55), Max: 37.6 (15 Mar 2018 16:28)  T(F): 97.9 (16 Mar 2018 04:55), Max: 99.7 (15 Mar 2018 16:28)  HR: 79 (16 Mar 2018 04:55) (79 - 90)  BP: 112/68 (16 Mar 2018 04:55) (108/85 - 131/75)  BP(mean): --  RR: 17 (16 Mar 2018 04:55) (14 - 18)  SpO2: 92% (16 Mar 2018 04:55) (92% - 99%)      [PHYSICAL EXAM]  General: WN, WD, adult in NAD  HEENT: clear oropharynx, anicteric sclera, pink conjunctivae  Neck: supple, no thyroid mass  CV: normal S1S2 RRR, no mrmur, no rubs, no gallops  Lungs: clear to auscultation BL, no wheezes, no rales, no rhonchi  Abdomen: soft non-tender non-distended, no hepatomegaly, no splenomegaly  Ext: no clubbing, no cyanosis, no edema  Skin: no rashes, no petechiae  Neuro: alert and oriented X3, no focal deficits      [LABS:]                        9.3    x     )-----------( x        ( 16 Mar 2018 07:42 )             30.8     CBC Full  -  ( 16 Mar 2018 07:42 )  WBC Count : x  Hemoglobin : 9.3 g/dL  Hematocrit : 30.8 %  Platelet Count - Automated : x  Mean Cell Volume : x  Mean Cell Hemoglobin : x  Mean Cell Hemoglobin Concentration : x  Auto Neutrophil # : x  Auto Lymphocyte # : x  Auto Monocyte # : x  Auto Eosinophil # : x  Auto Basophil # : x  Auto Neutrophil % : x  Auto Lymphocyte % : x  Auto Monocyte % : x  Auto Eosinophil % : x  Auto Basophil % : x    03-16    139  |  105  |  10  ----------------------------<  131<H>  3.8   |  25  |  0.66    Ca    8.8      16 Mar 2018 07:03    TPro  7.3  /  Alb  3.4  /  TBili  0.3  /  DBili  x   /  AST  17  /  ALT  25  /  AlkPhos  45  03-16    PT/INR - ( 15 Mar 2018 17:05 )   PT: 11.7 sec;   INR: 1.07 ratio         PTT - ( 15 Mar 2018 17:05 )  PTT:30.4 sec  LIVER FUNCTIONS - ( 16 Mar 2018 07:03 )  Alb: 3.4 g/dL / Pro: 7.3 g/dL / ALK PHOS: 45 U/L / ALT: 25 U/L / AST: 17 U/L / GGT: x               LAB TRENDS    WBC  TREND (5 Days)  WBC Count: 12.6 K/uL (03-15 @ 17:05)    HGB  TREND (5 Days)  Hemoglobin: 9.3 g/dL (03-16 @ 07:42)  Hemoglobin: 9.2 g/dL (03-15 @ 17:05)    HCT  TREND (5 Days)  Hematocrit: 30.8 % (03-16 @ 07:42)  Hematocrit: 30.1 % (03-15 @ 17:05)    PLT  TREND (5 Days)  Platelet Count - Automated: 479 K/uL (03-15 @ 17:05)                    [BLOOD SMEAR INTERPRETATION: ]  RBC: microcytic, hypochromic, rare polychromatic RBC  WBC: normal matures segs, no blasts,   Plts: adequate, no clumps      [RADIOLOGY & ADDITIONAL STUDIES:]  < from: CT Chest No Cont (03.16.18 @ 02:40) >  EXAM:  CT CHEST                        PROCEDURE DATE:  03/16/2018    INTERPRETATION:  History: Smoker, COPD.  Noncontrast chest CT.    Biapical fibrous parenchymal stranding and small blebs. No focal consolidation or suspicious nodularity.  No pleural collections.  Central airways patent. No mediastinal adenopathy. Hilar evaluation limited in the absence of IV contrast.  Normal heart size with coronary artery calcination. No pericardial abnormality.  Bilateral gynecomastia. Nonspecific right breast calcification.  Upper abdomen not remarkable. Osseous structures not remarkable.    Impression:    Biapical parenchymal scarring and blebs. Otherwise unremarkable   noncontrast study. No acute diagnostic findings.                NEHA WAGGONER M.D., ATTENDING RADIOLOGIST  This document has been electronically signed. Mar 16 2018  8:27AM                < end of copied text >

## 2018-03-16 NOTE — DISCHARGE NOTE ADULT - HOSPITAL COURSE
51 yo F history of mild intermittent asthma, epilepsy, HLD,  hypothyroidism, diabetes mellitus type 2, hx of GI bleed, bipolar disorder, obstructive sleep apnea, deviated septum, hypertension, GERD, sciatica of left foot, presented to the ED with complaints of weakness. States that she has been feeling very tired for quite some time and went to doctor who took her blood. PCP told her that her hemoglobin is low and that she needs to come to ED for blood transfusion. H/H upon admission was stable and at her baseline 9.2/30.1. No indication for transfusion. Seen by pulm-Dr. Fletcher, CT chest only significant for emphysematous changes. Recommended vitamin supplementation for weakness. Seen by psych-Dr. Myers, stable for d/c. Seen by heme/onc 53 yo F history of mild intermittent asthma, epilepsy, HLD,  hypothyroidism, diabetes mellitus type 2, hx of GI bleed, bipolar disorder, obstructive sleep apnea, deviated septum, hypertension, GERD, sciatica of left foot, presented to the ED with complaints of weakness. States that she has been feeling very tired for quite some time and went to doctor who took her blood. PCP told her that her hemoglobin is low and that she needs to come to ED for blood transfusion. H/H upon admission was stable and at her baseline 9.2/30.1. No indication for transfusion. Seen by pulm-Dr. Fletcher, CT chest only significant for emphysematous changes. Recommended vitamin supplementation for weakness. Seen by psych-Dr. Myers, stable for d/c. Seen by heme/onc.     Vital Signs Last 24 Hrs  T(C): 36.6 (16 Mar 2018 04:55), Max: 37.6 (15 Mar 2018 16:28)  T(F): 97.9 (16 Mar 2018 04:55), Max: 99.7 (15 Mar 2018 16:28)  HR: 79 (16 Mar 2018 04:55) (79 - 90)  BP: 112/68 (16 Mar 2018 04:55) (108/85 - 131/75)  BP(mean): --  RR: 17 (16 Mar 2018 04:55) (14 - 18)  SpO2: 92% (16 Mar 2018 04:55) (92% - 99%)    Physical Exam:  General: Elderly, [place] female, agitated in NAD.   HEENT: NCAT, PERRLA, EOMI bl, moist mucous membranes   Neck: Supple, nontender, no mass  Neurology: A&Ox3, nonfocal, CN II-XII grossly intact, sensation intact, no gait abnormalities   Respiratory: CTA B/L, No W/R/R  CV: RRR, +S1/S2, no murmurs, rubs or gallops  Abdominal: Obese, Soft, NT, ND +BSx4  Extremities: No C/C/E, + peripheral pulses  MSK: Normal ROM, no joint erythema or warmth, no joint swelling   Skin: warm, dry, normal color, no rash or abnormal lesions 53 yo F history of mild intermittent asthma, epilepsy, HLD,  hypothyroidism, diabetes mellitus type 2, hx of GI bleed, bipolar disorder, obstructive sleep apnea, deviated septum, hypertension, GERD, sciatica of left foot, presented to the ED with complaints of weakness. States that she has been feeling very tired for quite some time and went to doctor who took her blood. PCP told her that her hemoglobin is low and that she needs to come to ED for blood transfusion. H/H upon admission was stable and at her baseline 9.2/30.1. No indication for transfusion. Seen by Pulm-Dr. Fletcher, CT chest only significant for emphysematous changes. Recommended vitamin supplementation for weakness. Seen by Psych-Dr. Myers, stable for d/c. Seen by heme/onc stable for discharge. PMD Dr. Ramirez aware and will see patient in office within 1 week of discharge.        Vital Signs Last 24 Hrs  T(C): 36.6 (16 Mar 2018 04:55), Max: 37.6 (15 Mar 2018 16:28)  T(F): 97.9 (16 Mar 2018 04:55), Max: 99.7 (15 Mar 2018 16:28)  HR: 79 (16 Mar 2018 04:55) (79 - 90)  BP: 112/68 (16 Mar 2018 04:55) (108/85 - 131/75)  BP(mean): --  RR: 17 (16 Mar 2018 04:55) (14 - 18)  SpO2: 92% (16 Mar 2018 04:55) (92% - 99%)    Physical Exam:  General: Elderly, [place] female, agitated in NAD.   HEENT: NCAT, PERRLA, EOMI bl, moist mucous membranes   Neck: Supple, nontender, no mass  Neurology: A&Ox3, nonfocal, CN II-XII grossly intact, sensation intact, no gait abnormalities   Respiratory: CTA B/L, No W/R/R  CV: RRR, +S1/S2, no murmurs, rubs or gallops  Abdominal: Obese, Soft, NT, ND +BSx4  Extremities: No C/C/E, + peripheral pulses  MSK: Normal ROM, no joint erythema or warmth, no joint swelling   Skin: warm, dry, normal color, no rash or abnormal lesions 53 yo F history of mild intermittent asthma, epilepsy, HLD,  hypothyroidism, diabetes mellitus type 2, hx of GI bleed, bipolar disorder, obstructive sleep apnea, deviated septum, hypertension, GERD, sciatica of left foot, presented to the ED with complaints of weakness. States that she has been feeling very tired for quite some time and went to doctor who took her blood. PCP told her that her hemoglobin is low and that she needs to come to ED for blood transfusion. H/H upon admission was stable and at her baseline 9.2/30.1. No indication for transfusion. Seen by Pulm-Dr. Fletcher, CT chest only significant for emphysematous changes. Recommended vitamin supplementation for weakness. Seen by Psych-Dr. Myers, stable for d/c. Seen by heme/onc stable for discharge. PMD Dr. Ramirez aware and will see patient in office within 1 week of discharge.  Plan d/w PCP Dr. Renu Perez.       Vital Signs Last 24 Hrs  T(C): 36.6 (16 Mar 2018 04:55), Max: 37.6 (15 Mar 2018 16:28)  T(F): 97.9 (16 Mar 2018 04:55), Max: 99.7 (15 Mar 2018 16:28)  HR: 79 (16 Mar 2018 04:55) (79 - 90)  BP: 112/68 (16 Mar 2018 04:55) (108/85 - 131/75)  BP(mean): --  RR: 17 (16 Mar 2018 04:55) (14 - 18)  SpO2: 92% (16 Mar 2018 04:55) (92% - 99%)    Physical Exam:  General: Elderly, [place] female, agitated in NAD.   HEENT: NCAT, PERRLA, EOMI bl, moist mucous membranes   Neck: Supple, nontender, no mass  Neurology: A&Ox3, nonfocal, CN II-XII grossly intact, sensation intact, no gait abnormalities   Respiratory: CTA B/L, No W/R/R  CV: RRR, +S1/S2, no murmurs, rubs or gallops  Abdominal: Obese, Soft, NT, ND +BSx4  Extremities: No C/C/E, + peripheral pulses  MSK: Normal ROM, no joint erythema or warmth, no joint swelling   Skin: warm, dry, normal color, no rash or abnormal lesions

## 2018-03-16 NOTE — PROGRESS NOTE ADULT - SUBJECTIVE AND OBJECTIVE BOX
Date/Time Patient Seen:  		  Referring MD:   Data Reviewed	       Patient is a 52y old  Female who presents with a chief complaint of "back pain." (16 Mar 2018 01:46)  in bed  seen and examined  vs and meds reviewed        Subjective/HPI     PAST MEDICAL & SURGICAL HISTORY:  Epilepsy  HLD (hyperlipidemia)  Obstructive sleep apnea  Deviated septum: snores  Sciatica of left side: with burning sensation left foot  Asthma in adult: mild intermittent asthma  H/O heartburn  Diabetes mellitus: type 2  Bipolar disorder  H/O: HTN (hypertension)  History of spinal stenosis  H/O colonoscopy  H/O unilateral oophorectomy: left ovary 2004  S/P partial hysterectomy: age 35        Medication list         MEDICATIONS  (STANDING):  atorvastatin 10 milliGRAM(s) Oral at bedtime  buDESOnide 160 MICROgram(s)/formoterol 4.5 MICROgram(s) Inhaler 2 Puff(s) Inhalation two times a day  citalopram 60 milliGRAM(s) Oral at bedtime  dextrose 5%. 1000 milliLiter(s) (50 mL/Hr) IV Continuous <Continuous>  dextrose 50% Injectable 12.5 Gram(s) IV Push once  dextrose 50% Injectable 25 Gram(s) IV Push once  dextrose 50% Injectable 25 Gram(s) IV Push once  hydrochlorothiazide 25 milliGRAM(s) Oral daily  influenza   Vaccine 0.5 milliLiter(s) IntraMuscular once  insulin lispro (HumaLOG) corrective regimen sliding scale   SubCutaneous Before meals and at bedtime  lamoTRIgine 200 milliGRAM(s) Oral at bedtime  levothyroxine 112 MICROGram(s) Oral <User Schedule>  lisinopril 20 milliGRAM(s) Oral daily  multivitamin 1 Tablet(s) Oral daily  OLANZapine 5 milliGRAM(s) Oral at bedtime  pantoprazole    Tablet 40 milliGRAM(s) Oral before breakfast  pregabalin 150 milliGRAM(s) Oral two times a day    MEDICATIONS  (PRN):  ALBUTerol    90 MICROgram(s) HFA Inhaler 2 Puff(s) Inhalation every 6 hours PRN Shortness of Breath and/or Wheezing  dextrose Gel 1 Dose(s) Oral once PRN Blood Glucose LESS THAN 70 milliGRAM(s)/deciliter  glucagon  Injectable 1 milliGRAM(s) IntraMuscular once PRN Glucose LESS THAN 70 milligrams/deciliter         Vitals log        ICU Vital Signs Last 24 Hrs  T(C): 36.6 (16 Mar 2018 04:55), Max: 37.6 (15 Mar 2018 16:28)  T(F): 97.9 (16 Mar 2018 04:55), Max: 99.7 (15 Mar 2018 16:28)  HR: 79 (16 Mar 2018 04:55) (79 - 90)  BP: 112/68 (16 Mar 2018 04:55) (108/85 - 131/75)  BP(mean): --  ABP: --  ABP(mean): --  RR: 17 (16 Mar 2018 04:55) (14 - 18)  SpO2: 92% (16 Mar 2018 04:55) (92% - 99%)           Input and Output:  I&O's Detail      Lab Data                        9.2    12.6  )-----------( 479      ( 15 Mar 2018 17:05 )             30.1     03-15    135  |  100  |  14  ----------------------------<  80  4.3   |  28  |  0.80    Ca    9.0      15 Mar 2018 17:05    TPro  7.7  /  Alb  3.7  /  TBili  0.3  /  DBili  x   /  AST  27  /  ALT  24  /  AlkPhos  48  03-15            Review of Systems	      Objective     Physical Examination    head at  heart s1s2  lung dec BS  abd soft  obese  cn grossly int      Pertinent Lab findings & Imaging      Stalin:  NO   Adequate UO     I&O's Detail           Discussed with:     Cultures:	        Radiology

## 2018-03-16 NOTE — PHYSICAL THERAPY INITIAL EVALUATION ADULT - PERTINENT HX OF CURRENT PROBLEM, REHAB EVAL
Per H&P:53 yo F history of mild intermittent asthma, epilepsy, HLD,  hypothyroidism, diabetes mellitus type 2, hx of GI bleed, bipolar disorder, obstructive sleep apnea, deviated septum, hypertension, GERD, sciatica of left foot, presented to the ED with complaints of weakness.

## 2018-03-16 NOTE — PROGRESS NOTE ADULT - PROBLEM SELECTOR PLAN 3
CT chest reviewed  COPD and Emphysema  Smoker  Smoking cess ed   proventil PRN  Symbicort  assess other causes of dyspnea, eg. Anemia and Cardiac  will follow

## 2018-03-16 NOTE — DISCHARGE NOTE ADULT - PLAN OF CARE
stable blood counts -your blood counts were stable throughout hospitalization   -follow-up with your PMD within 1 week -continue with advair and proair -continue with your home meds -continue with tradjenta -continue with lamictal and lyrica -continue with synthroid -continue with cpap at night -your blood counts were stable throughout hospitalization   -follow-up with your PMD within 3 to 5 days

## 2018-03-16 NOTE — BEHAVIORAL HEALTH ASSESSMENT NOTE - HPI (INCLUDE ILLNESS QUALITY, SEVERITY, DURATION, TIMING, CONTEXT, MODIFYING FACTORS, ASSOCIATED SIGNS AND SYMPTOMS)
51 yo F history of mild intermittent asthma, epilepsy, HLD,  hypothyroidism, diabetes mellitus type 2, hx of GI bleed, bipolar disorder, obstructive sleep apnea, deviated septum, hypertension, GERD, sciatica of left foot, presented to the ED with complaints of weakness. States that she has been feeling very tired for quite some time and went to doctor who took her blood. PCP told her that her hemoglobin is low and that she needs to come to ED for blood transfusion. Patient denies SOB, CP, abdominal pain, headaches, dizziness, blood in stool. Pt states that she took her PM dose medications already. Pt's friend/employer is at bedside and provides some history as well.  Patient reports 14 year history of bipolar disorder/schizoaffective disorder, and is currently under outpatient care at [facility]. Currently no evidence of acute miguel or psychosis, but appears to be somewhat irritable and "wants to solve the problem of low energy."

## 2018-03-16 NOTE — DISCHARGE NOTE ADULT - PATIENT PORTAL LINK FT
You can access the [facility] Patient Portal, offered by [facility], by registering with the following website: [email or web address]

## 2018-03-16 NOTE — DISCHARGE NOTE ADULT - CARE PROVIDER_API CALL
Ana Sears (DO), Internal Medicine  Internal Medicine  [address] [place] [address]  Phone: [phone]  Fax: [phone]

## 2018-03-16 NOTE — DISCHARGE NOTE ADULT - MEDICATION SUMMARY - MEDICATIONS TO TAKE
I will START or STAY ON the medications listed below when I get home from the hospital:    lisinopril 20 mg oral tablet  -- 1 tab(s) by mouth once a day  -- Indication: For Hypertension    LaMICtal 200 mg oral tablet  --  by mouth   evening  -- Indication: For Seizures    Lyrica 150 mg oral capsule  -- 1 cap(s) by mouth 2 times a day  -- Indication: For Seizures    citalopram  -- 60 milligram(s) by mouth once a day (at bedtime)  -- Indication: For mood disorder    metFORMIN 500 mg oral tablet  -- orally 2 times a day  -- Indication: For Diabetes mellitus    Tradjenta 5 mg oral tablet  -- 1 tab(s) by mouth once a day  -- Indication: For Diabetes mellitus    pravastatin 20 mg oral tablet  -- 1 tab(s) by mouth once a day  -- Indication: For Hyperlipidemia    OLANZapine 5 mg oral tablet  -- 1 tab(s) by mouth once a day  -- Indication: For mood disorder    Advair Diskus  -- Indication: For Asthma in adult    ProAir HFA  -- Indication: For Asthma in adult    hydrochlorothiazide 25 mg oral tablet  -- 1 tab(s) by mouth once a day  morning  -- Indication: For Hypertension    omeprazole 20 mg oral delayed release capsule  -- 1 cap(s) by mouth once a day  -- Indication: For gerd    levothyroxine 112 mcg (0.112 mg) oral tablet  -- 1 tab(s) by mouth every other day  -- Indication: For Hypothyroidism    levothyroxine 125 mcg (0.125 mg) oral tablet  -- orally every other day (at bedtime)  -- Indication: For Hypothyroidism    Multiple Vitamins oral tablet  -- 1 tab(s) by mouth once a day  -- Indication: For vitamin

## 2018-03-16 NOTE — CONSULT NOTE ADULT - ASSESSMENT
I have discussed the above plan of care with patient/family in detail. They expressed understanding of the treatment plan . Risks, benefits and alternatives discussed in detail. I have asked if they have any questions or concerns and appropriately addressed them; all questions answered to their satisfactions and in lay terms.     > xxxxxx minutes spent in direct patient care, examining and counseling patient,  reviewing  the notes, lab data/ imaging , discussion with multidisciplinary team. 51yo  F with hx of bipolar d/o, hypothyroid, DM, epilepsy, GIB hx.   Subacute hx of chronic anemia, microcytic, likely due to Fe def.   Overall has not changed significantly     RECOMMEND:   No indication for PRBC txfusion at current time.   Refused IV iron infusion.   Outpt follow-up. Should contact PMD additional followup. If warranted referral to hematology outpt with participating in network provider.   Psychiatry followup.   GI followup, non-urgently as currently FOBT negative, unless condition changes.     I have discussed the above plan of care with patient in detail. They expressed understanding of the treatment plan . Risks, benefits and alternatives discussed in detail. I have asked if they have any questions or concerns and appropriately addressed them; all questions answered in lay terms, to best of ability.     Long term care likely to be difficult due to persistence in thoughts, despite rational discussion. 53yo  F with hx of bipolar d/o, hypothyroid, DM, epilepsy, GIB hx.   Subacute hx of chronic anemia, microcytic, likely due to Fe def.   Overall has not changed significantly     RECOMMEND:   No indication for PRBC txfusion at current time.   Refused IV iron infusion.   Outpt follow-up with PMD. Should contact PMD additional followup. If warranted referral to hematology outpt with participating in network provider.   Psychiatry followup.   GI followup, non-urgently as currently FOBT negative, unless condition changes.     I have discussed the above plan of care with patient in detail. They expressed understanding of the treatment plan . Risks, benefits and alternatives discussed in detail. I have asked if they have any questions or concerns and appropriately addressed them; all questions answered in lay terms, to best of ability.     Long term care likely to be difficult due to persistence in thoughts, despite rational discussion.

## 2018-03-17 LAB — LAMOTRIGINE SERPL-MCNC: 2.1 MCG/ML — LOW (ref 2.5–15)

## 2018-05-14 NOTE — H&P ADULT - ATTENDING COMMENTS
Abdominal Pain, N/V/D Seen and examined by attending MD, agree with above and edited to reflect my findings.

## 2018-05-17 ENCOUNTER — APPOINTMENT (OUTPATIENT)
Dept: OBGYN | Facility: CLINIC | Age: 53
End: 2018-05-17
Payer: MEDICARE

## 2018-05-17 VITALS
HEIGHT: 64 IN | SYSTOLIC BLOOD PRESSURE: 99 MMHG | WEIGHT: 185 LBS | DIASTOLIC BLOOD PRESSURE: 74 MMHG | BODY MASS INDEX: 31.58 KG/M2

## 2018-05-17 DIAGNOSIS — Z01.419 ENCOUNTER FOR GYNECOLOGICAL EXAMINATION (GENERAL) (ROUTINE) W/OUT ABNORMAL FINDINGS: ICD-10-CM

## 2018-05-17 PROCEDURE — 99396 PREV VISIT EST AGE 40-64: CPT

## 2018-05-22 LAB — CYTOLOGY CVX/VAG DOC THIN PREP: NORMAL

## 2018-05-23 ENCOUNTER — APPOINTMENT (OUTPATIENT)
Dept: OBGYN | Facility: CLINIC | Age: 53
End: 2018-05-23
Payer: MEDICARE

## 2018-05-23 ENCOUNTER — ASOB RESULT (OUTPATIENT)
Age: 53
End: 2018-05-23

## 2018-05-23 PROCEDURE — 76830 TRANSVAGINAL US NON-OB: CPT

## 2018-07-20 PROBLEM — G40.909 EPILEPSY, UNSPECIFIED, NOT INTRACTABLE, WITHOUT STATUS EPILEPTICUS: Chronic | Status: ACTIVE | Noted: 2018-03-16

## 2018-07-20 PROBLEM — E78.5 HYPERLIPIDEMIA, UNSPECIFIED: Chronic | Status: ACTIVE | Noted: 2018-03-16

## 2018-07-26 ENCOUNTER — APPOINTMENT (OUTPATIENT)
Dept: OBGYN | Facility: CLINIC | Age: 53
End: 2018-07-26

## 2020-01-24 NOTE — DISCHARGE NOTE ADULT - CARE PLAN
[FreeTextEntry1] : MRI lumbar spine (ZP 1/16/2010) was personally reviewed with the patient in the office today. MRI is essentially normal. There is a broad based disc bulge at L3-4 and L4-5 with mild left foraminal stenosis. There is no central canal stenosis. There is no overt disc herniation.  Principal Discharge DX:	Anemia  Goal:	stable blood counts  Assessment and plan of treatment:	-your blood counts were stable throughout hospitalization   -follow-up with your PMD within 1 week  Secondary Diagnosis:	Asthma in adult  Assessment and plan of treatment:	-continue with advair and proair  Secondary Diagnosis:	Bipolar disorder  Assessment and plan of treatment:	-continue with your home meds  Secondary Diagnosis:	Diabetes mellitus  Assessment and plan of treatment:	-continue with tradjenta  Secondary Diagnosis:	Epilepsy  Assessment and plan of treatment:	-continue with lamictal and lyrica  Secondary Diagnosis:	Hypothyroidism  Assessment and plan of treatment:	-continue with synthroid  Secondary Diagnosis:	Obstructive sleep apnea  Assessment and plan of treatment:	-continue with cpap at night Principal Discharge DX:	Anemia  Goal:	stable blood counts  Assessment and plan of treatment:	-your blood counts were stable throughout hospitalization   -follow-up with your PMD within 3 to 5 days  Secondary Diagnosis:	Asthma in adult  Assessment and plan of treatment:	-continue with advair and proair  Secondary Diagnosis:	Bipolar disorder  Assessment and plan of treatment:	-continue with your home meds  Secondary Diagnosis:	Diabetes mellitus  Assessment and plan of treatment:	-continue with tradjenta  Secondary Diagnosis:	Epilepsy  Assessment and plan of treatment:	-continue with lamictal and lyrica  Secondary Diagnosis:	Hypothyroidism  Assessment and plan of treatment:	-continue with synthroid  Secondary Diagnosis:	Obstructive sleep apnea  Assessment and plan of treatment:	-continue with cpap at night

## 2020-12-16 PROBLEM — Z01.419 ENCOUNTER FOR GYNECOLOGICAL EXAMINATION WITH PAPANICOLAOU SMEAR OF CERVIX: Status: RESOLVED | Noted: 2018-05-17 | Resolved: 2020-12-16

## 2021-09-22 NOTE — ED ADULT NURSE NOTE - PAIN: PRESENCE, MLM
[facility] encounter  BONE MARROW BIOPSY    PROCEDURE:   Left posterior iliac crest bone marrow aspirate and biopsy.    INDICATIONS:   Pancytopenia; Neutropenia    DESCRIPTION OF PROCEDURE:   After verbal and written informed consent obtained, a \"Timeout\" was performed, verifing correct patient by using patient name and date of birth as well as verified the correct procedure.    The patient was prepped and draped in the usual fashion for a Left posterior iliac crest bone marrow aspirate and biopsy. 16 mL of 2 % Lidocaine were used by local infiltration to achieve local anesthesia. A scalpel blade was used to create a nick in the skin.  A Jamshidi needle was was used to obtain bone marrow aspirate for studies, then used to obtain a generous core biopsy for studies. The patient tolerated the procedure.    Estimated Blood Loss: Less than 5 mL.   Per med tech, adequate spicules obtained.  Good core obtained despite known osteoporosis.    Post procedure written and verbal instructions were given.   Patient will be coordinated to follow-up with Dr. Mccann in 1 week to discuss results.   Good core obtained despite known osteoporosis.    Edel Mancini PA-C      
denies pain/discomfort

## 2024-05-04 NOTE — BEHAVIORAL HEALTH ASSESSMENT NOTE - ORIENTED TO PLACE
Yes
[facility]  Gastroenterology  [address] [place] [address]  Phone: [phone]  Fax: [phone]  Follow Up Time:     Denis Dhaliwal  Pediatric Gastroenterology  [address] [place] [address], [id number]  [place] [address]  Phone: [phone]  Fax: [phone]  Follow Up Time:

## 2025-03-10 NOTE — PHYSICAL THERAPY INITIAL EVALUATION ADULT - CRITERIA FOR SKILLED THERAPEUTIC INTERVENTIONS
anticipated discharge recommendation
Pt states "I am having my left hernia repaired, maybe a right if I have one there too."